# Patient Record
Sex: MALE | Race: BLACK OR AFRICAN AMERICAN | Employment: FULL TIME | URBAN - METROPOLITAN AREA
[De-identification: names, ages, dates, MRNs, and addresses within clinical notes are randomized per-mention and may not be internally consistent; named-entity substitution may affect disease eponyms.]

---

## 2017-02-15 ENCOUNTER — OFFICE VISIT (OUTPATIENT)
Dept: FAMILY MEDICINE CLINIC | Age: 47
End: 2017-02-15

## 2017-02-15 VITALS
RESPIRATION RATE: 16 BRPM | HEIGHT: 71 IN | OXYGEN SATURATION: 98 % | WEIGHT: 223 LBS | TEMPERATURE: 98.9 F | SYSTOLIC BLOOD PRESSURE: 135 MMHG | BODY MASS INDEX: 31.22 KG/M2 | DIASTOLIC BLOOD PRESSURE: 85 MMHG | HEART RATE: 83 BPM

## 2017-02-15 DIAGNOSIS — K59.00 CONSTIPATION, UNSPECIFIED CONSTIPATION TYPE: ICD-10-CM

## 2017-02-15 DIAGNOSIS — K64.9 HEMORRHOIDS, UNSPECIFIED HEMORRHOID TYPE: Primary | ICD-10-CM

## 2017-02-15 DIAGNOSIS — R10.32 LLQ ABDOMINAL PAIN: ICD-10-CM

## 2017-02-15 PROCEDURE — 99214 OFFICE O/P EST MOD 30 MIN: CPT | Performed by: NURSE PRACTITIONER

## 2017-02-15 RX ORDER — HYDROCORTISONE ACETATE 25 MG/1
25 SUPPOSITORY RECTAL EVERY 12 HOURS
Qty: 60 SUPPOSITORY | Refills: 0 | Status: SHIPPED | OUTPATIENT
Start: 2017-02-15 | End: 2017-05-18

## 2017-02-15 ASSESSMENT — ENCOUNTER SYMPTOMS
ABDOMINAL PAIN: 1
SHORTNESS OF BREATH: 0
BELCHING: 0
CHEST TIGHTNESS: 0
COUGH: 0
EYES NEGATIVE: 1
NAUSEA: 0
VOMITING: 0
ALLERGIC/IMMUNOLOGIC NEGATIVE: 1
BACK PAIN: 0
WHEEZING: 0
FLATUS: 1
CONSTIPATION: 1
DIARRHEA: 0
HEMATOCHEZIA: 0

## 2017-02-15 ASSESSMENT — CROHNS DISEASE ACTIVITY INDEX (CDAI): CDAI SCORE: 0

## 2017-02-15 ASSESSMENT — PATIENT HEALTH QUESTIONNAIRE - PHQ9
SUM OF ALL RESPONSES TO PHQ QUESTIONS 1-9: 0
1. LITTLE INTEREST OR PLEASURE IN DOING THINGS: 0
2. FEELING DOWN, DEPRESSED OR HOPELESS: 0
SUM OF ALL RESPONSES TO PHQ9 QUESTIONS 1 & 2: 0

## 2017-05-18 ENCOUNTER — HOSPITAL ENCOUNTER (OUTPATIENT)
Dept: GENERAL RADIOLOGY | Age: 47
Discharge: HOME | End: 2017-05-18

## 2017-05-18 ENCOUNTER — OFFICE VISIT (OUTPATIENT)
Dept: FAMILY MEDICINE CLINIC | Age: 47
End: 2017-05-18

## 2017-05-18 VITALS
RESPIRATION RATE: 14 BRPM | HEART RATE: 82 BPM | DIASTOLIC BLOOD PRESSURE: 88 MMHG | WEIGHT: 220 LBS | OXYGEN SATURATION: 97 % | SYSTOLIC BLOOD PRESSURE: 118 MMHG | HEIGHT: 70 IN | BODY MASS INDEX: 31.5 KG/M2

## 2017-05-18 DIAGNOSIS — M25.561 RIGHT KNEE PAIN, UNSPECIFIED CHRONICITY: ICD-10-CM

## 2017-05-18 DIAGNOSIS — M25.561 ACUTE PAIN OF RIGHT KNEE: Primary | ICD-10-CM

## 2017-05-18 PROCEDURE — 99214 OFFICE O/P EST MOD 30 MIN: CPT | Performed by: FAMILY MEDICINE

## 2017-05-18 RX ORDER — MELOXICAM 15 MG/1
15 TABLET ORAL DAILY
Qty: 30 TABLET | Refills: 3 | Status: SHIPPED | OUTPATIENT
Start: 2017-05-18 | End: 2017-06-06 | Stop reason: ALTCHOICE

## 2017-05-18 ASSESSMENT — ENCOUNTER SYMPTOMS
COLOR CHANGE: 0
NAUSEA: 0
VOMITING: 0
BACK PAIN: 0
ABDOMINAL PAIN: 0

## 2017-06-06 ENCOUNTER — OFFICE VISIT (OUTPATIENT)
Dept: FAMILY MEDICINE CLINIC | Age: 47
End: 2017-06-06

## 2017-06-06 VITALS
HEIGHT: 69 IN | SYSTOLIC BLOOD PRESSURE: 130 MMHG | WEIGHT: 222 LBS | TEMPERATURE: 98.3 F | DIASTOLIC BLOOD PRESSURE: 88 MMHG | HEART RATE: 98 BPM | RESPIRATION RATE: 16 BRPM | BODY MASS INDEX: 32.88 KG/M2

## 2017-06-06 DIAGNOSIS — K64.9 HEMORRHOIDS, UNSPECIFIED HEMORRHOID TYPE: ICD-10-CM

## 2017-06-06 DIAGNOSIS — M25.561 ACUTE PAIN OF RIGHT KNEE: ICD-10-CM

## 2017-06-06 DIAGNOSIS — Z00.00 ROUTINE GENERAL MEDICAL EXAMINATION AT A HEALTH CARE FACILITY: Primary | ICD-10-CM

## 2017-06-06 DIAGNOSIS — Z00.00 ROUTINE GENERAL MEDICAL EXAMINATION AT A HEALTH CARE FACILITY: ICD-10-CM

## 2017-06-06 LAB
BILIRUBIN, POC: NORMAL
BLOOD URINE, POC: NORMAL
CLARITY, POC: NORMAL
COLOR, POC: NORMAL
GLUCOSE URINE, POC: NORMAL
KETONES, POC: NORMAL
LEUKOCYTE EST, POC: NORMAL
NITRITE, POC: NORMAL
PH, POC: 6.5
PROTEIN, POC: NORMAL
SPECIFIC GRAVITY, POC: 1.02
UROBILINOGEN, POC: NORMAL

## 2017-06-06 PROCEDURE — 99396 PREV VISIT EST AGE 40-64: CPT | Performed by: FAMILY MEDICINE

## 2017-06-06 PROCEDURE — 81003 URINALYSIS AUTO W/O SCOPE: CPT | Performed by: FAMILY MEDICINE

## 2017-06-06 ASSESSMENT — ENCOUNTER SYMPTOMS
CONSTIPATION: 0
PHOTOPHOBIA: 0
NAUSEA: 0
COUGH: 0
SHORTNESS OF BREATH: 0
ABDOMINAL PAIN: 0
TROUBLE SWALLOWING: 0
SINUS PRESSURE: 0
ANAL BLEEDING: 0
DIARRHEA: 0
WHEEZING: 0
SORE THROAT: 0
BLOOD IN STOOL: 0
BACK PAIN: 0

## 2017-06-07 LAB
A/G RATIO: 1.4 (ref 1.1–2.2)
ALBUMIN SERPL-MCNC: 4.6 G/DL (ref 3.4–5)
ALP BLD-CCNC: 58 U/L (ref 40–129)
ALT SERPL-CCNC: 24 U/L (ref 10–40)
ANION GAP SERPL CALCULATED.3IONS-SCNC: 19 MMOL/L (ref 3–16)
AST SERPL-CCNC: 20 U/L (ref 15–37)
BASOPHILS ABSOLUTE: 0.1 K/UL (ref 0–0.2)
BASOPHILS RELATIVE PERCENT: 0.6 %
BILIRUB SERPL-MCNC: 0.3 MG/DL (ref 0–1)
BUN BLDV-MCNC: 10 MG/DL (ref 7–20)
CALCIUM SERPL-MCNC: 9.6 MG/DL (ref 8.3–10.6)
CHLORIDE BLD-SCNC: 101 MMOL/L (ref 99–110)
CHOLESTEROL, TOTAL: 290 MG/DL (ref 0–199)
CO2: 23 MMOL/L (ref 21–32)
CREAT SERPL-MCNC: 1 MG/DL (ref 0.9–1.3)
EOSINOPHILS ABSOLUTE: 0.2 K/UL (ref 0–0.6)
EOSINOPHILS RELATIVE PERCENT: 2.1 %
GFR AFRICAN AMERICAN: >60
GFR NON-AFRICAN AMERICAN: >60
GLOBULIN: 3.2 G/DL
GLUCOSE BLD-MCNC: 89 MG/DL (ref 70–99)
HCT VFR BLD CALC: 50.2 % (ref 40.5–52.5)
HDLC SERPL-MCNC: 36 MG/DL (ref 40–60)
HEMOGLOBIN: 16.3 G/DL (ref 13.5–17.5)
LDL CHOLESTEROL CALCULATED: ABNORMAL MG/DL
LDL CHOLESTEROL DIRECT: 176 MG/DL
LYMPHOCYTES ABSOLUTE: 3.3 K/UL (ref 1–5.1)
LYMPHOCYTES RELATIVE PERCENT: 30.4 %
MAGNESIUM: 2.1 MG/DL (ref 1.8–2.4)
MCH RBC QN AUTO: 32.2 PG (ref 26–34)
MCHC RBC AUTO-ENTMCNC: 32.5 G/DL (ref 31–36)
MCV RBC AUTO: 99.1 FL (ref 80–100)
MONOCYTES ABSOLUTE: 0.5 K/UL (ref 0–1.3)
MONOCYTES RELATIVE PERCENT: 5.1 %
NEUTROPHILS ABSOLUTE: 6.7 K/UL (ref 1.7–7.7)
NEUTROPHILS RELATIVE PERCENT: 61.8 %
PDW BLD-RTO: 15.7 % (ref 12.4–15.4)
PLATELET # BLD: 272 K/UL (ref 135–450)
PMV BLD AUTO: 9.3 FL (ref 5–10.5)
POTASSIUM SERPL-SCNC: 5 MMOL/L (ref 3.5–5.1)
PROSTATE SPECIFIC ANTIGEN: 0.43 NG/ML (ref 0–4)
RBC # BLD: 5.07 M/UL (ref 4.2–5.9)
SODIUM BLD-SCNC: 143 MMOL/L (ref 136–145)
T3 FREE: 3.4 PG/ML (ref 2.3–4.2)
T4 FREE: 1.3 NG/DL (ref 0.9–1.8)
TOTAL PROTEIN: 7.8 G/DL (ref 6.4–8.2)
TRIGL SERPL-MCNC: 393 MG/DL (ref 0–150)
TSH SERPL DL<=0.05 MIU/L-ACNC: 1.38 UIU/ML (ref 0.27–4.2)
VITAMIN D 25-HYDROXY: 15 NG/ML
VLDLC SERPL CALC-MCNC: ABNORMAL MG/DL
WBC # BLD: 10.8 K/UL (ref 4–11)

## 2017-06-19 ENCOUNTER — OFFICE VISIT (OUTPATIENT)
Dept: SURGERY | Age: 47
End: 2017-06-19

## 2017-06-19 VITALS
HEIGHT: 69 IN | SYSTOLIC BLOOD PRESSURE: 120 MMHG | BODY MASS INDEX: 32.73 KG/M2 | DIASTOLIC BLOOD PRESSURE: 70 MMHG | WEIGHT: 221 LBS

## 2017-06-19 DIAGNOSIS — K64.9 HEMORRHOIDS, UNSPECIFIED HEMORRHOID TYPE: Primary | ICD-10-CM

## 2017-06-19 PROCEDURE — 46600 DIAGNOSTIC ANOSCOPY SPX: CPT | Performed by: SURGERY

## 2017-06-19 PROCEDURE — 99243 OFF/OP CNSLTJ NEW/EST LOW 30: CPT | Performed by: SURGERY

## 2017-06-20 ENCOUNTER — TELEPHONE (OUTPATIENT)
Dept: FAMILY MEDICINE CLINIC | Age: 47
End: 2017-06-20

## 2018-07-17 ENCOUNTER — OFFICE VISIT (OUTPATIENT)
Dept: FAMILY MEDICINE CLINIC | Age: 48
End: 2018-07-17

## 2018-07-17 VITALS
DIASTOLIC BLOOD PRESSURE: 88 MMHG | BODY MASS INDEX: 33.49 KG/M2 | HEIGHT: 68 IN | HEART RATE: 95 BPM | SYSTOLIC BLOOD PRESSURE: 126 MMHG | OXYGEN SATURATION: 98 % | WEIGHT: 221 LBS

## 2018-07-17 DIAGNOSIS — H10.33 ACUTE CONJUNCTIVITIS OF BOTH EYES, UNSPECIFIED ACUTE CONJUNCTIVITIS TYPE: Primary | ICD-10-CM

## 2018-07-17 DIAGNOSIS — K59.00 CONSTIPATION, UNSPECIFIED CONSTIPATION TYPE: ICD-10-CM

## 2018-07-17 PROCEDURE — 99213 OFFICE O/P EST LOW 20 MIN: CPT | Performed by: FAMILY MEDICINE

## 2018-07-17 RX ORDER — CIPROFLOXACIN HYDROCHLORIDE 3.5 MG/ML
2 SOLUTION/ DROPS TOPICAL 4 TIMES DAILY
Qty: 1 BOTTLE | Refills: 0 | Status: SHIPPED | OUTPATIENT
Start: 2018-07-17 | End: 2018-07-20

## 2018-07-17 ASSESSMENT — PATIENT HEALTH QUESTIONNAIRE - PHQ9
1. LITTLE INTEREST OR PLEASURE IN DOING THINGS: 0
2. FEELING DOWN, DEPRESSED OR HOPELESS: 0
SUM OF ALL RESPONSES TO PHQ QUESTIONS 1-9: 0
SUM OF ALL RESPONSES TO PHQ9 QUESTIONS 1 & 2: 0

## 2018-07-17 ASSESSMENT — ENCOUNTER SYMPTOMS
CONSTIPATION: 0
DIARRHEA: 0
BLOOD IN STOOL: 0

## 2018-07-17 NOTE — PROGRESS NOTES
Subjective:      Patient ID: Giovanni Pena is a 41 y.o. male. HPI  Watery and red eyes. 3 days. Left into the right. Not real crusty . Mucous like. Vision his normal.  Irritated feeling. No pain with blink    Not a lot of allergies    Review of Systems   Constitutional: Negative for chills, fever and unexpected weight change. Gastrointestinal: Negative for blood in stool, constipation and diarrhea. Some \"full\" feeling in the upper abdomen- left more so past couple of months  Bowels generally OK. Some feeling even after BM    No dyspepsia / gerd sx    No family hx Cancer   Genitourinary: Negative for dysuria. Objective:   Physical Exam   Constitutional: He appears well-developed and well-nourished. No distress. Eyes:   Some proptosis -  His usual  EOM OK  Pupils equal  Injected sclera,    Mucous discharge   Abdominal: He exhibits no distension and no mass. There is no tenderness. Non- specific abdomen. No mass  Mid left abdomen the area identified   Vitals reviewed. Assessment:        Blepharoconjunctivitis      Plan:           Treat the eyes with abx drop. May be allergy some but no allergy hx. Follow 3-4 days if not better. If better , follow as needed For the eyes    Call the stomach doctor for evaluation    GI eval.    Prior to Visit Medications    Medication Sig Taking? Authorizing Provider   Elastic Bandages & Supports (NEOPRENE KNEE SUPPORT) MIS 1 kit by Does not apply route daily Yes Marisela Moreno,    erythromycin [facility]) 5 MG/GM ophthalmic ointment Place into both eyes 3 times daily for 10 days Nightly.   Marisela Moreno, DO

## 2018-07-20 ENCOUNTER — OFFICE VISIT (OUTPATIENT)
Dept: FAMILY MEDICINE CLINIC | Age: 48
End: 2018-07-20

## 2018-07-20 VITALS
RESPIRATION RATE: 14 BRPM | SYSTOLIC BLOOD PRESSURE: 138 MMHG | DIASTOLIC BLOOD PRESSURE: 82 MMHG | BODY MASS INDEX: 33.77 KG/M2 | HEART RATE: 91 BPM | OXYGEN SATURATION: 96 % | WEIGHT: 228 LBS | HEIGHT: 69 IN

## 2018-07-20 DIAGNOSIS — H10.33 ACUTE CONJUNCTIVITIS OF BOTH EYES, UNSPECIFIED ACUTE CONJUNCTIVITIS TYPE: Primary | ICD-10-CM

## 2018-07-20 PROCEDURE — 99213 OFFICE O/P EST LOW 20 MIN: CPT | Performed by: FAMILY MEDICINE

## 2018-07-20 RX ORDER — ERYTHROMYCIN 5 MG/G
OINTMENT OPHTHALMIC 3 TIMES DAILY
Qty: 1 TUBE | Refills: 0 | Status: SHIPPED | OUTPATIENT
Start: 2018-07-20 | End: 2018-07-30

## 2018-07-20 ASSESSMENT — ENCOUNTER SYMPTOMS
PHOTOPHOBIA: 0
EYE ITCHING: 0
EYE PAIN: 1
EYE REDNESS: 1
EYE DISCHARGE: 1

## 2018-07-20 NOTE — PATIENT INSTRUCTIONS
You should quit smoking for the benefit of your health and those who love you. Your goal is to quit smoking. The first step is to cut down your cigarettes per week by half. This is your immediate goal.   Cut down your number of cigarettes per week in half before your next visit. Go to  [employer] on the internet for more assistance in your effort to quit smoking. Or call 0-776- Naida Millan for more assistance. Good luck and keep up the effort! Many patients quit after several times of trying to quit and failing, so do not get discouraged if you don't quit on your first try.

## 2018-07-20 NOTE — PROGRESS NOTES
DO   Elastic Bandages & Supports (NEOPRENE KNEE SUPPORT) MISC 1 kit by Does not apply route daily Yes Opal Escobar,

## 2018-08-02 ENCOUNTER — INITIAL CONSULT (OUTPATIENT)
Dept: GASTROENTEROLOGY | Age: 48
End: 2018-08-02

## 2018-08-02 ENCOUNTER — TELEPHONE (OUTPATIENT)
Dept: GASTROENTEROLOGY | Age: 48
End: 2018-08-02

## 2018-08-02 VITALS
WEIGHT: 228.6 LBS | HEIGHT: 69 IN | SYSTOLIC BLOOD PRESSURE: 120 MMHG | DIASTOLIC BLOOD PRESSURE: 80 MMHG | BODY MASS INDEX: 33.86 KG/M2

## 2018-08-02 DIAGNOSIS — Z12.11 SCREENING FOR COLON CANCER: ICD-10-CM

## 2018-08-02 DIAGNOSIS — K59.00 CONSTIPATION, UNSPECIFIED CONSTIPATION TYPE: Primary | ICD-10-CM

## 2018-08-02 PROCEDURE — 99204 OFFICE O/P NEW MOD 45 MIN: CPT | Performed by: INTERNAL MEDICINE

## 2018-08-02 RX ORDER — POLYETHYLENE GLYCOL 3350, SODIUM CHLORIDE, POTASSIUM CHLORIDE, SODIUM BICARBONATE, AND SODIUM SULFATE 240; 5.84; 2.98; 6.72; 22.72 G/4L; G/4L; G/4L; G/4L; G/4L
4000 POWDER, FOR SOLUTION ORAL ONCE
Qty: 1 BOTTLE | Refills: 0 | Status: SHIPPED | OUTPATIENT
Start: 2018-08-02 | End: 2018-08-02

## 2018-08-02 NOTE — PROGRESS NOTES
[address] ,  [address] [facility]  Phone: [phone] [address]     Chief Complaint   Patient presents with    Constipation     Started about 1 year ago, also has hemorrhoids, abdominal pain     HPI     Thank you Yamilet Murrell DO for asking me to see Media Free in consultation. He is a  [2] Black [2] 41 y.o. . 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 Media Free  Complains of 1 year of constipation with incomplete evacuation with associated intermittent hemorrhoid swelling but not bleeding. No family history of gi problems including no polyps or cancer. Ca and TSH are normal.  Has tried multiple fibers and miralax without relief. HPI elements: location, severity, timing, modifying factors, quality, duration, context and associated signs/symptoms. Last Encounter Reviewed:   Pertinent PMH, FH, SH is reviewed below. Last EGD: none  Last Colonoscopy: none    Review of available records reveals:   Wt Readings from Last 50 Encounters:   08/02/18 228 lb 9.6 oz (103.7 kg)   07/20/18 228 lb (103.4 kg)   07/17/18 221 lb (100.2 kg)   06/19/17 221 lb (100.2 kg)   06/06/17 222 lb (100.7 kg)   05/18/17 220 lb (99.8 kg)   02/15/17 223 lb (101.2 kg)   12/20/16 210 lb (95.3 kg)   04/22/16 213 lb (96.6 kg)   09/03/15 217 lb (98.4 kg)   05/28/15 216 lb (98 kg)       No components found for: HGBA1C  BP Readings from Last 3 Encounters:   08/02/18 120/80   07/20/18 138/82   07/17/18 126/88     Health Maintenance   Topic Date Due    HIV screen  12/04/1987    DTaP/Tdap/Td vaccine (1 - Tdap) 12/04/1991    Pneumococcal med risk (1 of 1 - PPSV23) 12/04/1991    Flu vaccine (1) 09/01/2018    Lipid screen  06/06/2022       No components found for: [facility]     PAST MEDICAL HISTORY     Past Medical History:   Diagnosis Date    Achilles tendonitis 2011    dr Colette Barksdale Screening or Diagnostic? Answer:   Loyda Chavira was seen today for constipation. Diagnoses and all orders for this visit:    Constipation, unspecified constipation type  -     linaclotide (LINZESS) 290 MCG CAPS capsule; Take 1 capsule by mouth every morning (before breakfast)    Screening for colon cancer  -     COLONOSCOPY W/ OR W/O BIOPSY; Future  -     bisacodyl (DULCOLAX) 5 MG EC tablet; Take 1 tablet by mouth daily as needed for Constipation  -     polyethylene glycol-electrolytes (COLYTE) 240 g SOLR solution; Take 4,000 mLs by mouth once for 1 dose    Will provide a script for linzess at time of colonoscopy if it helps. Current recs are to begin screening for crc at the age of 39 in  [place]. ORDERED FUTURE/PENDING TESTS     Lab Frequency Next Occurrence       FOLLOWUP   Return for Colonoscopy.           Adri WATTS 8/2/18 1:35 PM    CC:  Cathy Noble DO

## 2018-08-02 NOTE — PATIENT INSTRUCTIONS
CONSTIPATION OVERVIEW - Constipation refers to a change in bowel habits, but it has varied meanings. Stools may be too hard or too small, difficult to pass, or infrequent (less than three times per week). People with constipation may also notice a frequent need to strain and a sense that the bowels are not empty. Constipation is a very common problem. Each year more than 2.5 million Americans visit their healthcare provider for relief from this problem. Many factors can contribute to or cause constipation, although in most people, no single cause can be found. In general, constipation occurs more frequently as you get older. (See \"Etiology and evaluation of chronic constipation in adults\".)    CONSTIPATION DIAGNOSIS - Constipation can usually be diagnosed based upon your symptoms and a physical examination. You should also mention any medications you take regularly since some medications can cause constipation. You may need a rectal examination as part of a physical examination. A rectal examination involves inserting a gloved finger inside the rectum to feel for any lumps or abnormalities. This test can also check for blood in the stool. Further testing may be ordered in some situations, for example, if you have had a recent change in bowel habits, blood in the stool, weight loss, or a family history of colon cancer. Testing may include blood tests, x-rays, sigmoidoscopy, colonoscopy, or more specialized testing if needed. (See \"Patient information: Flexible sigmoidoscopy\" and \"Patient information: Colonoscopy\". )    When to seek help - Most people can treat constipation at home, without seeing a healthcare provider.  However, you should speak with a healthcare provider if the problem:        Is new (ie, represents a change in your normal pattern)      Lasts longer than three weeks      Is severe      Is associated with any other concerning features such as blood on the toilet paper, weight loss, fevers, or help - Most people can treat constipation at home, without seeing a healthcare provider. However, you should speak with a healthcare provider if the problem:        Is new (ie, represents a change in your normal pattern)      Lasts longer than three weeks      Is severe      Is associated with any other concerning features such as blood on the toilet paper, weight loss, fevers, or weakness. CONSTIPATION TREATMENT - Treatment for constipation includes changing some behaviors, eating foods high in fiber, and using laxatives or enemas if needed. You can try these treatments at home, before seeing a healthcare provider. However, if you do not have a bowel movement within a few days, you should call your healthcare provider for further assistance. (See \"Management of chronic constipation in adults\". )    Behavior changes - The bowels are most active following meals, and this is often the time when stools will pass most readily. If you ignore your body's signals to have a bowel movement, the signals become weaker and weaker over time. By paying close attention to these signals, you may have an easier time moving your bowels. Drinking a caffeine-containing beverage in the morning may also be helpful. Increase fiber - Increasing fiber in your diet may reduce or eliminate constipation. The recommended amount of dietary fiber is 20 to 35 grams of fiber per day. By reading the product information panel on the side of the package, you can determine the number of grams of fiber per serving (figure 1). Many fruits and vegetables can be particularly helpful in preventing and treating constipation (table 1). This is especially true of citrus fruits, prunes, and prune juice. Some breakfast cereals are also an excellent source of dietary fiber. (See \"Patient information: High fiber diet\". )    Fiber side effects - Consuming large amounts of fiber can cause abdominal bloating or gas; this can be minimized by starting with a small amount and slowly increasing until stools become softer and more frequent. LAXATIVES - If behavior changes and increasing fiber does not relieve your constipation, you may try taking a laxative. A variety of laxatives are available for treating constipation. The choice between them is based upon how they work, how safe the treatment is, and your healthcare provider's preferences. In general, laxatives can be categorized into the following groups:    Bulk forming laxatives - These include natural fiber and commercial fiber preparations such as:        Psyllium (Konsyl®; Metamucil®; Perdiem®)      Methylcellulose (Citrucel®)      Calcium polycarbophil (FiberCon®; Fiber-Lax®; Mitrolan®). Wheat dextrin (Benefiber®)    You should increase the dose of fiber supplements slowly to prevent gas and cramping, and you should always take the supplement with plenty of fluid. Hyperosmolar laxatives - Hyperosmolar laxatives include:        Polyethylene glycol (MiraLax®, Glycolax®)      Lactulose      Sorbitol    Polyethylene glycol is generally preferred since it does not cause gas or bloating and is available in the [place] without a prescription. Lactulose and sorbitol can produce gas and bloating. Sorbitol works as well as lactulose and is much less expensive. Saline laxatives - Saline laxatives such as magnesium hydroxide (Milk of Magnesia®) and magnesium citrate (Evac-Q-Mag®) act similarly to the hyperosmolar laxatives. Stimulant laxatives - Stimulant laxatives include senna (eg, [facility] [place], ex-lax®, Real's® Castoria®, Senokot®) and bisacodyl (eg, Correctol®, Doxidan®, Dulcolax®). Some people overuse stimulant laxatives. Taking stimulant laxatives regularly or in large amounts can cause side effects, including low potassium levels. Thus, you should take these drugs carefully if you must use them regularly.     However, there is no convincing evidence that using stimulant laxatives

## 2018-08-02 NOTE — LETTER
COLONOSCOPY PREP INSTRUCTIONS  COLYTE SPLIT DOSE  [address] [facility], [address] [facility] , [place], [facility]     Your colonoscopy is scheduled on: ________   __Ann___  Arrival Time: ______   DO NOT EAT OR DRINK (INCLUDING WATER) AFTER: Taking 2nd dose of prep, morning of procedure_________  's Name_Shirley_ [phone]    Keep these papers together; REVIEW ALL OF THEM AT LEAST 7 DAYS BEFORE THE PROCEDURE. Please complete all paperwork; including a current list of your medications, to avoid delays in the admission process. The following instructions must be followed in order to ensure your procedure has optimal outcomes. - KEEP YOUR APPOINTMENT. If for any reason, you are unable to keep your appointment, please notify us at [phone] within 72 hours before your procedure. - You MUST have a responsible adult to drive you, who MUST remain at our facility the ENTIRE time. If not the procedure will be cancelled. You may go by taxi ONLY if you have a responsible adult with you. You may experience light headedness, dizziness etc., therefore you should have a responsible adult remain with you until the morning after your procedure. - Bring your insurance card and 's license. Call your insurance carrier to verify your benefits, and confirm that our facility is in your network, prior to the procedure date to ensure coverage. The facility name is listed as [facility] or [place] [address] and the tax ID# is [id number].  - Due to the safety and privacy of our patients, only one visitor is allowed in the recovery area after the procedure. The center will not be responsible for lost valuables so please leave them at home. - Try to avoid seeds (strawberries, dion, and rye) for one week prior to your procedure.   - If you have questions after beginning the prep, call [phone] Cyclopentyltrazolopyrimide (Ticagrelor or Brilinta), Cangrelor (Kirkland Olszewski), Dabigatran (Pradaxa), Apixaban (Eliquis), Edoxaban (Savaysa)you should have received instructions regarding if and when to discontinue the medication. If you have not, or do not clearly understand the instructions, please call the office for clarification ([phone]). 5. Drink plenty of fluid. 1 day prior to your procedure:  1. Do not eat any SOLID FOOD, beginning with breakfast drink clear liquids only, which includes: Chicken or Beef Broth, Coffee/tea (without milk or creamer) Gatorade/PowerAde (no red or purple), JeIl-O (no red or purple), All Soda (even dark cola), Sorbet/Popsicles (no red or purple), Water. If you take Diabetic medications (insulin/oral medication)-reduce the amount by one half on the morning of prep. You may resume the meds once you begin eating again. You must drink 8oz of liquids every hour to avoid dehydration. 2. If you take Diabetic medications (insulin/oral medication) - reduce the amount by one half on morning of prep. You may resume the meds once you begin eating again. 3. Bowel Cleansing Prep  * Prepare your Colyte by adding water to the fill line. Powder flavoring, like crystal light (not red or purple), may be added for flavor. Chilling the Colyte solution in the refrigerator and/or drinking the solution through a straw may make it easier to drink. (do not add ice)   5:45 pm Take all 4 dulcolax tablets   6:00pm Begin to drink the colyte/golyte solution. Drink one 8oz glass every 15 minutes until you have finished half of the solution (about 8 glasses, or 2 liters). It is ok to take short breaks in between glasses if you experience nausea or fullness. Restart when the symptoms improve. Continue to drink 8oz of liquids an hour until bedtime. Powder flavoring like crystal light may be added for flavor (no red or purple). MORNING OF PROCEDURE  1. ______(time) Repeat the 6:00 pm instructions. 2. Take your Blood pressure, Heart and Seizure medication the morning of the procedure with sips of water. 3. Bring inhalers with you. 4. Do not take your Diabetic medication the morning of procedure. 5. If you are not having an EGD, you may continue drinking liquids until 2hrs prior to procedure. Dear Patient,      Antoine Berrios will receive a call from the [facility] pre-registration department prior to your GI procedure. This will help streamline your check-in process on the day of service. During this call a skilled associate will review your demographic and insurance benefits information. The associate will answer any question pertaining to your insurance contract, such as deductible/co-insurance/ co-pay or any other financial concerns. They may offer an amount that you could pay on the day of surgery but this is not a requirement. Thank you for allowing [facility] to be part of your [address], [facility] [address] [facility]      Once you turn into the hospital, turn right (towards E.R.) go past the E.R., youll see a driveway on your left. Make that turn, the sign will say [facility] or Surgery entrance. [address] [facility]  Go through 2 sets of double doors, sign in at window, you will get registered there and they will send you up the hallway to the Endoscopy area.

## 2018-09-12 ENCOUNTER — TELEPHONE (OUTPATIENT)
Dept: GASTROENTEROLOGY | Age: 48
End: 2018-09-12

## 2018-09-12 NOTE — TELEPHONE ENCOUNTER
Sent pt a MiniLuxet message to see if he wanted to schedule the Colon with [facility] yet, will await response

## 2018-11-13 ENCOUNTER — OFFICE VISIT (OUTPATIENT)
Dept: FAMILY MEDICINE CLINIC | Age: 48
End: 2018-11-13
Payer: COMMERCIAL

## 2018-11-13 VITALS
HEIGHT: 68 IN | HEART RATE: 88 BPM | WEIGHT: 230 LBS | DIASTOLIC BLOOD PRESSURE: 80 MMHG | OXYGEN SATURATION: 99 % | BODY MASS INDEX: 34.86 KG/M2 | SYSTOLIC BLOOD PRESSURE: 132 MMHG

## 2018-11-13 DIAGNOSIS — K59.00 CONSTIPATION, UNSPECIFIED CONSTIPATION TYPE: ICD-10-CM

## 2018-11-13 DIAGNOSIS — Z00.00 ANNUAL PHYSICAL EXAM: Primary | ICD-10-CM

## 2018-11-13 DIAGNOSIS — Z00.00 ANNUAL PHYSICAL EXAM: ICD-10-CM

## 2018-11-13 LAB
A/G RATIO: 1.5 (ref 1.1–2.2)
ALBUMIN SERPL-MCNC: 4.6 G/DL (ref 3.4–5)
ALP BLD-CCNC: 56 U/L (ref 40–129)
ALT SERPL-CCNC: 29 U/L (ref 10–40)
ANION GAP SERPL CALCULATED.3IONS-SCNC: 14 MMOL/L (ref 3–16)
AST SERPL-CCNC: 29 U/L (ref 15–37)
ATYPICAL LYMPHOCYTE RELATIVE PERCENT: 1 % (ref 0–6)
BASOPHILS ABSOLUTE: 0.1 K/UL (ref 0–0.2)
BASOPHILS RELATIVE PERCENT: 1 %
BILIRUB SERPL-MCNC: 0.6 MG/DL (ref 0–1)
BUN BLDV-MCNC: 9 MG/DL (ref 7–20)
CALCIUM SERPL-MCNC: 10 MG/DL (ref 8.3–10.6)
CHLORIDE BLD-SCNC: 99 MMOL/L (ref 99–110)
CHOLESTEROL, TOTAL: 269 MG/DL (ref 0–199)
CO2: 26 MMOL/L (ref 21–32)
CREAT SERPL-MCNC: 1 MG/DL (ref 0.9–1.3)
EOSINOPHILS ABSOLUTE: 0.2 K/UL (ref 0–0.6)
EOSINOPHILS RELATIVE PERCENT: 2 %
GFR AFRICAN AMERICAN: >60
GFR NON-AFRICAN AMERICAN: >60
GLOBULIN: 3 G/DL
GLUCOSE BLD-MCNC: 94 MG/DL (ref 70–99)
HCT VFR BLD CALC: 47.4 % (ref 40.5–52.5)
HDLC SERPL-MCNC: 35 MG/DL (ref 40–60)
HEMOGLOBIN: 16 G/DL (ref 13.5–17.5)
LDL CHOLESTEROL CALCULATED: 180 MG/DL
LYMPHOCYTES ABSOLUTE: 3.3 K/UL (ref 1–5.1)
LYMPHOCYTES RELATIVE PERCENT: 36 %
MCH RBC QN AUTO: 33.1 PG (ref 26–34)
MCHC RBC AUTO-ENTMCNC: 33.8 G/DL (ref 31–36)
MCV RBC AUTO: 97.9 FL (ref 80–100)
MONOCYTES ABSOLUTE: 0.6 K/UL (ref 0–1.3)
MONOCYTES RELATIVE PERCENT: 7 %
NEUTROPHILS ABSOLUTE: 4.8 K/UL (ref 1.7–7.7)
NEUTROPHILS RELATIVE PERCENT: 53 %
PDW BLD-RTO: 15.1 % (ref 12.4–15.4)
PLATELET # BLD: 280 K/UL (ref 135–450)
PLATELET SLIDE REVIEW: ADEQUATE
PMV BLD AUTO: 8.9 FL (ref 5–10.5)
POTASSIUM SERPL-SCNC: 5.1 MMOL/L (ref 3.5–5.1)
RBC # BLD: 4.84 M/UL (ref 4.2–5.9)
RBC # BLD: NORMAL 10*6/UL
SLIDE REVIEW: NORMAL
SODIUM BLD-SCNC: 139 MMOL/L (ref 136–145)
TOTAL PROTEIN: 7.6 G/DL (ref 6.4–8.2)
TRIGL SERPL-MCNC: 269 MG/DL (ref 0–150)
TSH REFLEX: 1.42 UIU/ML (ref 0.27–4.2)
VITAMIN D 25-HYDROXY: 10.3 NG/ML
VLDLC SERPL CALC-MCNC: 54 MG/DL
WBC # BLD: 9 K/UL (ref 4–11)

## 2018-11-13 PROCEDURE — 99396 PREV VISIT EST AGE 40-64: CPT | Performed by: FAMILY MEDICINE

## 2018-11-13 PROCEDURE — 90732 PPSV23 VACC 2 YRS+ SUBQ/IM: CPT | Performed by: FAMILY MEDICINE

## 2018-11-13 PROCEDURE — 90472 IMMUNIZATION ADMIN EACH ADD: CPT | Performed by: FAMILY MEDICINE

## 2018-11-13 PROCEDURE — 90471 IMMUNIZATION ADMIN: CPT | Performed by: FAMILY MEDICINE

## 2018-11-13 PROCEDURE — 90686 IIV4 VACC NO PRSV 0.5 ML IM: CPT | Performed by: FAMILY MEDICINE

## 2018-11-13 ASSESSMENT — ENCOUNTER SYMPTOMS
SORE THROAT: 0
ABDOMINAL PAIN: 0
CONSTIPATION: 1
SHORTNESS OF BREATH: 0
TROUBLE SWALLOWING: 0
BLOOD IN STOOL: 0
BACK PAIN: 0
PHOTOPHOBIA: 0
WHEEZING: 0
COUGH: 0
SINUS PRESSURE: 0
DIARRHEA: 0

## 2018-11-28 ENCOUNTER — TELEPHONE (OUTPATIENT)
Dept: GASTROENTEROLOGY | Age: 48
End: 2018-11-28

## 2018-12-17 ENCOUNTER — TELEPHONE (OUTPATIENT)
Dept: GASTROENTEROLOGY | Age: 48
End: 2018-12-17

## 2018-12-18 ENCOUNTER — ANESTHESIA (OUTPATIENT)
Dept: ENDOSCOPY | Age: 48
End: 2018-12-18
Payer: COMMERCIAL

## 2018-12-18 ENCOUNTER — ANESTHESIA EVENT (OUTPATIENT)
Dept: ENDOSCOPY | Age: 48
End: 2018-12-18
Payer: COMMERCIAL

## 2018-12-18 ENCOUNTER — HOSPITAL ENCOUNTER (OUTPATIENT)
Age: 48
Setting detail: OUTPATIENT SURGERY
Discharge: HOME | End: 2018-12-18
Attending: INTERNAL MEDICINE | Admitting: INTERNAL MEDICINE
Payer: COMMERCIAL

## 2018-12-18 VITALS
SYSTOLIC BLOOD PRESSURE: 147 MMHG | OXYGEN SATURATION: 100 % | HEART RATE: 70 BPM | BODY MASS INDEX: 31.5 KG/M2 | TEMPERATURE: 97.3 F | WEIGHT: 225 LBS | HEIGHT: 71 IN | DIASTOLIC BLOOD PRESSURE: 92 MMHG | RESPIRATION RATE: 16 BRPM

## 2018-12-18 VITALS — OXYGEN SATURATION: 97 % | RESPIRATION RATE: 26 BRPM

## 2018-12-18 PROCEDURE — 6360000002 HC RX W HCPCS: Performed by: NURSE ANESTHETIST, CERTIFIED REGISTERED

## 2018-12-18 PROCEDURE — 3700000000 HC ANESTHESIA ATTENDED CARE: Performed by: INTERNAL MEDICINE

## 2018-12-18 PROCEDURE — C1773 RET DEV, INSERTABLE: HCPCS | Performed by: INTERNAL MEDICINE

## 2018-12-18 PROCEDURE — 7100000011 HC PHASE II RECOVERY - ADDTL 15 MIN: Performed by: INTERNAL MEDICINE

## 2018-12-18 PROCEDURE — 88305 TISSUE EXAM BY PATHOLOGIST: CPT

## 2018-12-18 PROCEDURE — 7100000010 HC PHASE II RECOVERY - FIRST 15 MIN: Performed by: INTERNAL MEDICINE

## 2018-12-18 PROCEDURE — 45385 COLONOSCOPY W/LESION REMOVAL: CPT | Performed by: INTERNAL MEDICINE

## 2018-12-18 PROCEDURE — 3609019800 HC COLONOSCOPY WITH SUBMUCOSAL INJECTION: Performed by: INTERNAL MEDICINE

## 2018-12-18 PROCEDURE — 2580000003 HC RX 258: Performed by: INTERNAL MEDICINE

## 2018-12-18 PROCEDURE — 2709999900 HC NON-CHARGEABLE SUPPLY: Performed by: INTERNAL MEDICINE

## 2018-12-18 PROCEDURE — 45380 COLONOSCOPY AND BIOPSY: CPT | Performed by: INTERNAL MEDICINE

## 2018-12-18 PROCEDURE — 3609010300 HC COLONOSCOPY W/BIOPSY SINGLE/MULTIPLE: Performed by: INTERNAL MEDICINE

## 2018-12-18 PROCEDURE — 3609010600 HC COLONOSCOPY POLYPECTOMY SNARE/COLD BIOPSY: Performed by: INTERNAL MEDICINE

## 2018-12-18 PROCEDURE — 3700000001 HC ADD 15 MINUTES (ANESTHESIA): Performed by: INTERNAL MEDICINE

## 2018-12-18 PROCEDURE — 45381 COLONOSCOPY SUBMUCOUS NJX: CPT | Performed by: INTERNAL MEDICINE

## 2018-12-18 RX ORDER — PROPOFOL 10 MG/ML
INJECTION, EMULSION INTRAVENOUS PRN
Status: DISCONTINUED | OUTPATIENT
Start: 2018-12-18 | End: 2018-12-18 | Stop reason: SDUPTHER

## 2018-12-18 RX ORDER — SODIUM CHLORIDE, SODIUM LACTATE, POTASSIUM CHLORIDE, CALCIUM CHLORIDE 600; 310; 30; 20 MG/100ML; MG/100ML; MG/100ML; MG/100ML
INJECTION, SOLUTION INTRAVENOUS CONTINUOUS PRN
Status: DISCONTINUED | OUTPATIENT
Start: 2018-12-18 | End: 2018-12-18 | Stop reason: HOSPADM

## 2018-12-18 RX ADMIN — PROPOFOL 25 MG: 10 INJECTION, EMULSION INTRAVENOUS at 11:15

## 2018-12-18 RX ADMIN — PROPOFOL 25 MG: 10 INJECTION, EMULSION INTRAVENOUS at 10:59

## 2018-12-18 RX ADMIN — PROPOFOL 25 MG: 10 INJECTION, EMULSION INTRAVENOUS at 11:08

## 2018-12-18 RX ADMIN — PROPOFOL 25 MG: 10 INJECTION, EMULSION INTRAVENOUS at 11:17

## 2018-12-18 RX ADMIN — PROPOFOL 25 MG: 10 INJECTION, EMULSION INTRAVENOUS at 11:19

## 2018-12-18 RX ADMIN — PROPOFOL 25 MG: 10 INJECTION, EMULSION INTRAVENOUS at 10:57

## 2018-12-18 RX ADMIN — PROPOFOL 50 MG: 10 INJECTION, EMULSION INTRAVENOUS at 10:53

## 2018-12-18 RX ADMIN — PROPOFOL 25 MG: 10 INJECTION, EMULSION INTRAVENOUS at 11:06

## 2018-12-18 RX ADMIN — PROPOFOL 25 MG: 10 INJECTION, EMULSION INTRAVENOUS at 10:55

## 2018-12-18 RX ADMIN — PROPOFOL 25 MG: 10 INJECTION, EMULSION INTRAVENOUS at 11:04

## 2018-12-18 RX ADMIN — PROPOFOL 25 MG: 10 INJECTION, EMULSION INTRAVENOUS at 11:13

## 2018-12-18 RX ADMIN — PROPOFOL 25 MG: 10 INJECTION, EMULSION INTRAVENOUS at 11:02

## 2018-12-18 RX ADMIN — PROPOFOL 25 MG: 10 INJECTION, EMULSION INTRAVENOUS at 11:01

## 2018-12-18 RX ADMIN — PROPOFOL 25 MG: 10 INJECTION, EMULSION INTRAVENOUS at 11:21

## 2018-12-18 RX ADMIN — PROPOFOL 25 MG: 10 INJECTION, EMULSION INTRAVENOUS at 11:10

## 2018-12-18 ASSESSMENT — PAIN - FUNCTIONAL ASSESSMENT: PAIN_FUNCTIONAL_ASSESSMENT: 0-10

## 2018-12-18 NOTE — ANESTHESIA PRE PROCEDURE
vascular ROS. Anesthesia Plan      TIVA     ASA 2       Induction: intravenous. Anesthetic plan and risks discussed with patient. Plan discussed with CRNA. All questions answered and agrees with plan.         Benita Yan MD   12/18/2018

## 2018-12-19 ENCOUNTER — TELEPHONE (OUTPATIENT)
Dept: GASTROENTEROLOGY | Age: 48
End: 2018-12-19

## 2019-02-14 ENCOUNTER — OFFICE VISIT (OUTPATIENT)
Dept: FAMILY MEDICINE CLINIC | Age: 49
End: 2019-02-14
Payer: COMMERCIAL

## 2019-02-14 VITALS
SYSTOLIC BLOOD PRESSURE: 138 MMHG | HEIGHT: 71 IN | BODY MASS INDEX: 32.9 KG/M2 | DIASTOLIC BLOOD PRESSURE: 84 MMHG | WEIGHT: 235 LBS | OXYGEN SATURATION: 98 % | HEART RATE: 85 BPM

## 2019-02-14 DIAGNOSIS — Z72.0 TOBACCO USE: ICD-10-CM

## 2019-02-14 DIAGNOSIS — H53.001 AMBLYOPIA OF RIGHT EYE: ICD-10-CM

## 2019-02-14 DIAGNOSIS — Z01.818 PRE-OP EXAM: ICD-10-CM

## 2019-02-14 DIAGNOSIS — K63.5 MULTIPLE POLYPS OF SIGMOID COLON: Primary | ICD-10-CM

## 2019-02-14 PROCEDURE — 99214 OFFICE O/P EST MOD 30 MIN: CPT | Performed by: FAMILY MEDICINE

## 2019-02-14 ASSESSMENT — ENCOUNTER SYMPTOMS
CONSTIPATION: 0
COUGH: 0
WHEEZING: 0
SINUS PRESSURE: 0
BACK PAIN: 0
SORE THROAT: 0
ABDOMINAL PAIN: 0
DIARRHEA: 0
TROUBLE SWALLOWING: 0
BLOOD IN STOOL: 0
SHORTNESS OF BREATH: 0
PHOTOPHOBIA: 0

## 2019-02-14 ASSESSMENT — PATIENT HEALTH QUESTIONNAIRE - PHQ9
SUM OF ALL RESPONSES TO PHQ9 QUESTIONS 1 & 2: 0
SUM OF ALL RESPONSES TO PHQ QUESTIONS 1-9: 0
1. LITTLE INTEREST OR PLEASURE IN DOING THINGS: 0
SUM OF ALL RESPONSES TO PHQ QUESTIONS 1-9: 0
2. FEELING DOWN, DEPRESSED OR HOPELESS: 0

## 2019-08-28 ENCOUNTER — OFFICE VISIT (OUTPATIENT)
Dept: FAMILY MEDICINE CLINIC | Age: 49
End: 2019-08-28
Payer: COMMERCIAL

## 2019-08-28 VITALS
BODY MASS INDEX: 32.76 KG/M2 | HEIGHT: 71 IN | WEIGHT: 234 LBS | DIASTOLIC BLOOD PRESSURE: 86 MMHG | OXYGEN SATURATION: 99 % | HEART RATE: 100 BPM | SYSTOLIC BLOOD PRESSURE: 136 MMHG

## 2019-08-28 DIAGNOSIS — M76.61 ACHILLES TENDINITIS, RIGHT LEG: Primary | ICD-10-CM

## 2019-08-28 PROCEDURE — 99213 OFFICE O/P EST LOW 20 MIN: CPT | Performed by: FAMILY MEDICINE

## 2019-08-28 NOTE — PROGRESS NOTES
current outpatient medications on file. No current facility-administered medications for this visit.

## 2019-09-05 ENCOUNTER — OFFICE VISIT (OUTPATIENT)
Dept: ORTHOPEDIC SURGERY | Age: 49
End: 2019-09-05
Payer: COMMERCIAL

## 2019-09-05 VITALS — WEIGHT: 233.91 LBS | HEIGHT: 71 IN | BODY MASS INDEX: 32.75 KG/M2

## 2019-09-05 DIAGNOSIS — R52 PAIN: Primary | ICD-10-CM

## 2019-09-05 PROCEDURE — 99243 OFF/OP CNSLTJ NEW/EST LOW 30: CPT | Performed by: ORTHOPAEDIC SURGERY

## 2019-09-05 PROCEDURE — L4361 PNEUMA/VAC WALK BOOT PRE OTS: HCPCS | Performed by: ORTHOPAEDIC SURGERY

## 2019-09-05 RX ORDER — PREDNISONE 10 MG/1
TABLET ORAL
Qty: 14 TABLET | Refills: 0 | Status: SHIPPED | OUTPATIENT
Start: 2019-09-05 | End: 2019-11-25 | Stop reason: ALTCHOICE

## 2019-09-06 ENCOUNTER — TELEPHONE (OUTPATIENT)
Dept: ORTHOPEDIC SURGERY | Age: 49
End: 2019-09-06

## 2019-09-18 ENCOUNTER — OFFICE VISIT (OUTPATIENT)
Dept: ORTHOPEDIC SURGERY | Age: 49
End: 2019-09-18
Payer: COMMERCIAL

## 2019-09-18 VITALS — HEIGHT: 71 IN | BODY MASS INDEX: 32.75 KG/M2 | WEIGHT: 233.91 LBS

## 2019-09-18 DIAGNOSIS — R52 PAIN: Primary | ICD-10-CM

## 2019-09-18 PROCEDURE — 99212 OFFICE O/P EST SF 10 MIN: CPT | Performed by: ORTHOPAEDIC SURGERY

## 2019-09-20 ENCOUNTER — HOSPITAL ENCOUNTER (OUTPATIENT)
Dept: PHYSICAL THERAPY | Age: 49
Setting detail: THERAPIES SERIES
Discharge: HOME | End: 2019-09-20
Payer: COMMERCIAL

## 2019-09-20 PROCEDURE — 97140 MANUAL THERAPY 1/> REGIONS: CPT | Performed by: PHYSICAL THERAPIST

## 2019-09-20 PROCEDURE — 97110 THERAPEUTIC EXERCISES: CPT | Performed by: PHYSICAL THERAPIST

## 2019-09-20 PROCEDURE — 97161 PT EVAL LOW COMPLEX 20 MIN: CPT | Performed by: PHYSICAL THERAPIST

## 2019-09-20 NOTE — PLAN OF CARE
[address] [facility], [address] [facility] Chidi Jones, [address] [facility] [address]  Phone: [phone]   Fax:     [phone]       Physical Therapy Certification    Dear Referring Practitioner: Castillo Andino,    We had the pleasure of evaluating the following patient for physical therapy services at [address] [place] [address]. A summary of our findings can be found in the initial assessment below. This includes our plan of care. If you have any questions or concerns regarding these findings, please do not hesitate to contact me at the office phone number checked above. Thank you for the referral.       Physician Signature:_______________________________Date:__________________  By signing above (or electronic signature), therapists plan is approved by physician      Patient: Robert Cosme   : 1970   MRN: [id number]  Referring Physician: Referring Practitioner: Castillo Andino      Evaluation Date: 2019      Medical Diagnosis Information:  Diagnosis: PT: R achilles tendinitis, Bernie's deformity   Treatment Diagnosis: R ankle achilles tenidnitis M76.61                                         Insurance information: PT Insurance Information: [employer]     Precautions/ Contra-indications: none  Latex Allergy:  [x]NO      []YES  Preferred Language for Healthcare:   [x]English       []other:    SUBJECTIVE: Patient stated complaint:Patient reports pain in ankle started a couple years ago with no known cause. Patient had tendon transfer on L, thinks he may proceed with surgery on R. Per MD, \"We will scan him and discuss FHL tendon transfer versus debridement oversewing and augmentation and will followup with me 2 weeks. \" In last month, pain has steadily increased. Patient was given walking boot, but states following meds he did not feel he needed it.     Relevant Medical History: L Achilles tendon repair mechanics with sitting, bending, and lifting   []Reduced ability to sleep   [] Reduced ability or tolerance with driving and/or computer work   [x]Reduced ability to perform lifting, carrying tasks   [x]Reduced ability to squat   [x]Reduced ability to forward bend   [x]Reduced ability to ambulate prolonged functional periods/distances/surfaces   [x]Reduced ability to ascend/descend stairs   [x]Reduced ability to run, hop, cut or jump   []other:    Participation Restrictions   []Reduced participation in self care activities   [x]Reduced participation in home management activities   [x]Reduced participation in work activities   [x]Reduced participation in social activities. [x]Reduced participation in sport/recreation activities. Classification :    []Signs/symptoms consistent with post-surgical status including decreased ROM, strength and function.    []Signs/symptoms consistent with joint sprain/strain   []Signs/symptoms consistent with patella-femoral syndrome   []Signs/symptoms consistent with knee OA/hip OA   []Signs/symptoms consistent with internal derangement of knee/Hip   []Signs/symptoms consistent with functional hip weakness/NMR control      [x]Signs/symptoms consistent with tendinitis/tendinosis    []signs/symptoms consistent with pathology which may benefit from Dry needling      []other:      Prognosis/Rehab Potential:      []Excellent   [x]Good    []Fair   []Poor    Tolerance of evaluation/treatment:    []Excellent   [x]Good    []Fair   []Poor    Physical Therapy Evaluation Complexity Justification  [x] A history of present problem with:  [] no personal factors and/or comorbidities that impact the plan of care;  [x]1-2 personal factors and/or comorbidities that impact the plan of care  []3 personal factors and/or comorbidities that impact the plan of care  [x] An examination of body systems using standardized tests and measures addressing any of the following: body structures and functions

## 2019-09-20 NOTE — FLOWSHEET NOTE
I without gait abnormality and ascend/ descend stairs with reciprocal gait. [] Progressing: [] Met: [] Not Met: [] Adjusted     5. Patient will be able to jog > 20 minutes without symptoms to return to PLOF. [] Progressing: [] Met: [] Not Met: [] Adjusted        Overall Progression Towards Functional goals/ Treatment Progress Update:  [] Patient is progressing as expected towards functional goals listed. [] Progression is slowed due to complexities/Impairments listed. [] Progression has been slowed due to co-morbidities. [x] Plan just implemented, too soon to assess goals progression <30days   [] Goals require adjustment due to lack of progress  [] Patient is not progressing as expected and requires additional follow up with physician  [] Other    Prognosis for POC: [x] Good [] Fair  [] Poor      Patient requires continued skilled intervention: [x] Yes  [] No    Treatment/Activity Tolerance:  [x] Patient able to complete treatment  [] Patient limited by fatigue  [] Patient limited by pain    [] Patient limited by other medical complications  [] Other:     PLAN: See eval  [] Continue per plan of care [] Alter current plan (see comments above)  [x] Plan of care initiated [] Hold pending MD visit [] Discharge      Electronically signed by:  Gabby Brumfield PT    Note: If patient does not return for scheduled/ recommended follow up visits, this note will serve as a discharge from care along with most recent update on progress.

## 2019-09-24 ENCOUNTER — HOSPITAL ENCOUNTER (OUTPATIENT)
Dept: PHYSICAL THERAPY | Age: 49
Setting detail: THERAPIES SERIES
Discharge: HOME | End: 2019-09-24
Payer: COMMERCIAL

## 2019-09-24 PROCEDURE — 97140 MANUAL THERAPY 1/> REGIONS: CPT | Performed by: PHYSICAL THERAPIST

## 2019-09-24 PROCEDURE — 97110 THERAPEUTIC EXERCISES: CPT | Performed by: PHYSICAL THERAPIST

## 2019-09-24 NOTE — FLOWSHEET NOTE
NMR re-education (47074)  CUES NEEDED                                                          Therapeutic Activity (04621)                                              Therapeutic Exercise and NMR EXR  [x] (09725) Provided verbal/tactile cueing for activities related to strengthening, flexibility, endurance, ROM for improvements in LE, proximal hip, and core control with self care, mobility, lifting, ambulation. [x] (33953) Provided verbal/tactile cueing for activities related to improving balance, coordination, kinesthetic sense, posture, motor skill, proprioception to assist with LE, proximal hip, and core control in self-care, mobility, lifting, ambulation and eccentric single leg control.      NMR and Therapeutic Activities:    [x] (62113 or 46591) Provided verbal/tactile cueing for activities related to improving balance, coordination, kinesthetic sense, posture, motor skill, proprioception and motor activation to allow for proper function of core, proximal hip and LE with self-care and ADLs and functional mobility.   [] (50619) Gait Re-education- Provided training and instruction to the patient for proper LE, core and proximal hip recruitment and positioning and eccentric body weight control with ambulation re-education including up and down stairs     Home Exercise Program:    [x] (03434) Reviewed/Progressed HEP activities related to strengthening, flexibility, endurance, ROM of core, proximal hip and LE for functional self-care, mobility, lifting and ambulation/stair navigation   [] (71475) Reviewed/Progressed HEP activities related to improving balance, coordination, kinesthetic sense, posture, motor skill, proprioception of core, proximal hip and LE for self-care, mobility, lifting, and ambulation/stair navigation      Manual Treatments:  PROM / STM / Oscillations-Mobs:  G-I, II, III, IV (PA's, Inf., Post.)  [x] (45229) Provided manual therapy to mobilize LE, proximal hip and/or LS spine Deficits. [] Progressing: [] Met: [] Not Met: [] Adjusted     4. Patient will return to full functional activities without increased symptoms or restriction including ability to ambulate I without gait abnormality and ascend/ descend stairs with reciprocal gait. [x] Progressing: [] Met: [] Not Met: [] Adjusted     5. Patient will be able to jog > 20 minutes without symptoms to return to PLOF. [] Progressing: [] Met: [] Not Met: [] Adjusted        Overall Progression Towards Functional goals/ Treatment Progress Update:  [x] Patient is progressing as expected towards functional goals listed. [] Progression is slowed due to complexities/Impairments listed. [] Progression has been slowed due to co-morbidities. [] Plan just implemented, too soon to assess goals progression <30days   [] Goals require adjustment due to lack of progress  [] Patient is not progressing as expected and requires additional follow up with physician  [] Other    Prognosis for POC: [x] Good [] Fair  [] Poor      Patient requires continued skilled intervention: [x] Yes  [] No    Treatment/Activity Tolerance:  [x] Patient able to complete treatment  [] Patient limited by fatigue  [] Patient limited by pain    [] Patient limited by other medical complications  [] Other:     PLAN: See eval  [x] Continue per plan of care [] Alter current plan (see comments above)  [] Plan of care initiated [] Hold pending MD visit [] Discharge      Electronically signed by:  Giovanni Irvin PT    Note: If patient does not return for scheduled/ recommended follow up visits, this note will serve as a discharge from care along with most recent update on progress.

## 2019-09-26 ENCOUNTER — HOSPITAL ENCOUNTER (OUTPATIENT)
Dept: PHYSICAL THERAPY | Age: 49
Setting detail: THERAPIES SERIES
Discharge: HOME | End: 2019-09-26
Payer: COMMERCIAL

## 2019-09-26 PROCEDURE — 97110 THERAPEUTIC EXERCISES: CPT | Performed by: PHYSICAL THERAPIST

## 2019-09-26 PROCEDURE — 97112 NEUROMUSCULAR REEDUCATION: CPT | Performed by: PHYSICAL THERAPIST

## 2019-09-26 NOTE — FLOWSHEET NOTE
Progressing: [] Met: [] Not Met: [] Adjusted     4. Patient will return to full functional activities without increased symptoms or restriction including ability to ambulate I without gait abnormality and ascend/ descend stairs with reciprocal gait. [x] Progressing: [] Met: [] Not Met: [] Adjusted     5. Patient will be able to jog > 20 minutes without symptoms to return to PLOF. [] Progressing: [] Met: [] Not Met: [] Adjusted        Overall Progression Towards Functional goals/ Treatment Progress Update:  [x] Patient is progressing as expected towards functional goals listed. [] Progression is slowed due to complexities/Impairments listed. [] Progression has been slowed due to co-morbidities. [] Plan just implemented, too soon to assess goals progression <30days   [] Goals require adjustment due to lack of progress  [] Patient is not progressing as expected and requires additional follow up with physician  [] Other    Prognosis for POC: [x] Good [] Fair  [] Poor      Patient requires continued skilled intervention: [x] Yes  [] No    Treatment/Activity Tolerance:  [x] Patient able to complete treatment  [] Patient limited by fatigue  [] Patient limited by pain    [] Patient limited by other medical complications  [] Other:     PLAN: See eval  [x] Continue per plan of care [] Alter current plan (see comments above)  [] Plan of care initiated [] Hold pending MD visit [] Discharge      Electronically signed by:  Jazmine Persaud PT    Note: If patient does not return for scheduled/ recommended follow up visits, this note will serve as a discharge from care along with most recent update on progress.

## 2019-10-01 ENCOUNTER — HOSPITAL ENCOUNTER (OUTPATIENT)
Dept: PHYSICAL THERAPY | Age: 49
Setting detail: THERAPIES SERIES
Discharge: HOME | End: 2019-10-01
Payer: COMMERCIAL

## 2019-10-01 PROCEDURE — 97110 THERAPEUTIC EXERCISES: CPT | Performed by: PHYSICAL THERAPIST

## 2019-10-01 PROCEDURE — 97112 NEUROMUSCULAR REEDUCATION: CPT | Performed by: PHYSICAL THERAPIST

## 2019-10-03 ENCOUNTER — HOSPITAL ENCOUNTER (OUTPATIENT)
Dept: PHYSICAL THERAPY | Age: 49
Setting detail: THERAPIES SERIES
Discharge: HOME | End: 2019-10-03
Payer: COMMERCIAL

## 2019-10-07 ENCOUNTER — HOSPITAL ENCOUNTER (EMERGENCY)
Age: 49
Discharge: HOME | End: 2019-10-07
Payer: COMMERCIAL

## 2019-10-07 VITALS
SYSTOLIC BLOOD PRESSURE: 138 MMHG | BODY MASS INDEX: 32.2 KG/M2 | WEIGHT: 230 LBS | RESPIRATION RATE: 16 BRPM | OXYGEN SATURATION: 98 % | HEART RATE: 92 BPM | HEIGHT: 71 IN | TEMPERATURE: 97.2 F | DIASTOLIC BLOOD PRESSURE: 85 MMHG

## 2019-10-07 DIAGNOSIS — S16.1XXA STRAIN OF NECK MUSCLE, INITIAL ENCOUNTER: Primary | ICD-10-CM

## 2019-10-07 PROCEDURE — 99282 EMERGENCY DEPT VISIT SF MDM: CPT

## 2019-10-07 PROCEDURE — 6370000000 HC RX 637 (ALT 250 FOR IP): Performed by: PHYSICIAN ASSISTANT

## 2019-10-07 RX ORDER — LIDOCAINE 4 G/G
1 PATCH TOPICAL ONCE
Status: DISCONTINUED | OUTPATIENT
Start: 2019-10-07 | End: 2019-10-07 | Stop reason: HOSPADM

## 2019-10-07 RX ORDER — NAPROXEN 500 MG/1
500 TABLET ORAL 2 TIMES DAILY
Qty: 20 TABLET | Refills: 0 | Status: SHIPPED | OUTPATIENT
Start: 2019-10-07 | End: 2019-11-25 | Stop reason: ALTCHOICE

## 2019-10-07 RX ORDER — IBUPROFEN 600 MG/1
600 TABLET ORAL ONCE
Status: COMPLETED | OUTPATIENT
Start: 2019-10-07 | End: 2019-10-07

## 2019-10-07 RX ORDER — LIDOCAINE 50 MG/G
1 PATCH TOPICAL DAILY
Qty: 30 PATCH | Refills: 0 | Status: SHIPPED | OUTPATIENT
Start: 2019-10-07 | End: 2019-11-25 | Stop reason: ALTCHOICE

## 2019-10-07 RX ORDER — CYCLOBENZAPRINE HCL 10 MG
10 TABLET ORAL 3 TIMES DAILY PRN
Qty: 20 TABLET | Refills: 0 | Status: SHIPPED | OUTPATIENT
Start: 2019-10-07 | End: 2019-10-14

## 2019-10-07 RX ADMIN — IBUPROFEN 600 MG: 600 TABLET, FILM COATED ORAL at 10:29

## 2019-10-07 ASSESSMENT — PAIN DESCRIPTION - DESCRIPTORS: DESCRIPTORS: ACHING

## 2019-10-07 ASSESSMENT — PAIN SCALES - GENERAL
PAINLEVEL_OUTOF10: 8
PAINLEVEL_OUTOF10: 8

## 2019-10-07 ASSESSMENT — ENCOUNTER SYMPTOMS
GASTROINTESTINAL NEGATIVE: 1
RESPIRATORY NEGATIVE: 1

## 2019-10-07 ASSESSMENT — PAIN DESCRIPTION - PAIN TYPE: TYPE: ACUTE PAIN

## 2019-10-07 ASSESSMENT — PAIN DESCRIPTION - LOCATION: LOCATION: NECK

## 2019-10-07 ASSESSMENT — PAIN DESCRIPTION - FREQUENCY: FREQUENCY: CONTINUOUS

## 2019-10-08 ENCOUNTER — HOSPITAL ENCOUNTER (OUTPATIENT)
Dept: PHYSICAL THERAPY | Age: 49
Setting detail: THERAPIES SERIES
Discharge: HOME | End: 2019-10-08
Payer: COMMERCIAL

## 2019-11-25 ENCOUNTER — OFFICE VISIT (OUTPATIENT)
Dept: FAMILY MEDICINE CLINIC | Age: 49
End: 2019-11-25
Payer: COMMERCIAL

## 2019-11-25 VITALS
OXYGEN SATURATION: 98 % | SYSTOLIC BLOOD PRESSURE: 136 MMHG | HEART RATE: 93 BPM | HEIGHT: 71 IN | WEIGHT: 230 LBS | DIASTOLIC BLOOD PRESSURE: 82 MMHG | BODY MASS INDEX: 32.2 KG/M2

## 2019-11-25 DIAGNOSIS — Z00.00 ANNUAL PHYSICAL EXAM: ICD-10-CM

## 2019-11-25 DIAGNOSIS — E55.9 VITAMIN D DEFICIENCY: ICD-10-CM

## 2019-11-25 DIAGNOSIS — Z00.00 ANNUAL PHYSICAL EXAM: Primary | ICD-10-CM

## 2019-11-25 DIAGNOSIS — D23.21: ICD-10-CM

## 2019-11-25 LAB
A/G RATIO: 1.6 (ref 1.1–2.2)
ALBUMIN SERPL-MCNC: 4.6 G/DL (ref 3.4–5)
ALP BLD-CCNC: 60 U/L (ref 40–129)
ALT SERPL-CCNC: 23 U/L (ref 10–40)
ANION GAP SERPL CALCULATED.3IONS-SCNC: 14 MMOL/L (ref 3–16)
AST SERPL-CCNC: 21 U/L (ref 15–37)
BASOPHILS ABSOLUTE: 0.1 K/UL (ref 0–0.2)
BASOPHILS RELATIVE PERCENT: 0.9 %
BILIRUB SERPL-MCNC: 0.5 MG/DL (ref 0–1)
BUN BLDV-MCNC: 9 MG/DL (ref 7–20)
CALCIUM SERPL-MCNC: 10 MG/DL (ref 8.3–10.6)
CHLORIDE BLD-SCNC: 98 MMOL/L (ref 99–110)
CHOLESTEROL, TOTAL: 272 MG/DL (ref 0–199)
CO2: 23 MMOL/L (ref 21–32)
CREAT SERPL-MCNC: 1 MG/DL (ref 0.9–1.3)
EOSINOPHILS ABSOLUTE: 0.2 K/UL (ref 0–0.6)
EOSINOPHILS RELATIVE PERCENT: 2.1 %
GFR AFRICAN AMERICAN: >60
GFR NON-AFRICAN AMERICAN: >60
GLOBULIN: 2.9 G/DL
GLUCOSE BLD-MCNC: 97 MG/DL (ref 70–99)
HCT VFR BLD CALC: 49.1 % (ref 40.5–52.5)
HDLC SERPL-MCNC: 42 MG/DL (ref 40–60)
HEMOGLOBIN: 16.4 G/DL (ref 13.5–17.5)
LDL CHOLESTEROL CALCULATED: 173 MG/DL
LYMPHOCYTES ABSOLUTE: 3.1 K/UL (ref 1–5.1)
LYMPHOCYTES RELATIVE PERCENT: 33.7 %
MCH RBC QN AUTO: 33.2 PG (ref 26–34)
MCHC RBC AUTO-ENTMCNC: 33.4 G/DL (ref 31–36)
MCV RBC AUTO: 99.3 FL (ref 80–100)
MONOCYTES ABSOLUTE: 0.6 K/UL (ref 0–1.3)
MONOCYTES RELATIVE PERCENT: 6.6 %
NEUTROPHILS ABSOLUTE: 5.3 K/UL (ref 1.7–7.7)
NEUTROPHILS RELATIVE PERCENT: 56.7 %
PDW BLD-RTO: 15.6 % (ref 12.4–15.4)
PLATELET # BLD: 326 K/UL (ref 135–450)
PMV BLD AUTO: 9.2 FL (ref 5–10.5)
POTASSIUM SERPL-SCNC: 5.1 MMOL/L (ref 3.5–5.1)
RBC # BLD: 4.94 M/UL (ref 4.2–5.9)
SODIUM BLD-SCNC: 135 MMOL/L (ref 136–145)
TOTAL PROTEIN: 7.5 G/DL (ref 6.4–8.2)
TRIGL SERPL-MCNC: 287 MG/DL (ref 0–150)
TSH REFLEX: 1.53 UIU/ML (ref 0.27–4.2)
VITAMIN D 25-HYDROXY: 6.2 NG/ML
VLDLC SERPL CALC-MCNC: 57 MG/DL
WBC # BLD: 9.3 K/UL (ref 4–11)

## 2019-11-25 PROCEDURE — 99396 PREV VISIT EST AGE 40-64: CPT | Performed by: FAMILY MEDICINE

## 2019-11-25 PROCEDURE — 90471 IMMUNIZATION ADMIN: CPT | Performed by: FAMILY MEDICINE

## 2019-11-25 PROCEDURE — 90686 IIV4 VACC NO PRSV 0.5 ML IM: CPT | Performed by: FAMILY MEDICINE

## 2019-11-25 ASSESSMENT — ENCOUNTER SYMPTOMS
DIARRHEA: 0
ABDOMINAL PAIN: 0
CONSTIPATION: 0

## 2019-12-05 ENCOUNTER — TELEPHONE (OUTPATIENT)
Dept: FAMILY MEDICINE CLINIC | Age: 49
End: 2019-12-05

## 2019-12-05 RX ORDER — DOXYCYCLINE HYCLATE 100 MG
100 TABLET ORAL 2 TIMES DAILY
Qty: 30 TABLET | Refills: 0 | Status: SHIPPED | OUTPATIENT
Start: 2019-12-05 | End: 2020-06-22 | Stop reason: ALTCHOICE

## 2020-01-25 RX ORDER — ATORVASTATIN CALCIUM 40 MG/1
40 TABLET, FILM COATED ORAL DAILY
Qty: 30 TABLET | Refills: 5 | Status: SHIPPED | OUTPATIENT
Start: 2020-01-25 | End: 2020-06-22 | Stop reason: SDUPTHER

## 2020-03-26 RX ORDER — ERYTHROMYCIN 5 MG/G
OINTMENT OPHTHALMIC
Qty: 1 G | Refills: 1 | Status: SHIPPED | OUTPATIENT
Start: 2020-03-26 | End: 2020-04-05

## 2020-06-22 ENCOUNTER — OFFICE VISIT (OUTPATIENT)
Dept: FAMILY MEDICINE CLINIC | Age: 50
End: 2020-06-22
Payer: COMMERCIAL

## 2020-06-22 VITALS
SYSTOLIC BLOOD PRESSURE: 142 MMHG | TEMPERATURE: 98.3 F | DIASTOLIC BLOOD PRESSURE: 86 MMHG | OXYGEN SATURATION: 97 % | BODY MASS INDEX: 33.05 KG/M2 | HEART RATE: 79 BPM | RESPIRATION RATE: 18 BRPM | WEIGHT: 237 LBS

## 2020-06-22 PROCEDURE — 99213 OFFICE O/P EST LOW 20 MIN: CPT | Performed by: NURSE PRACTITIONER

## 2020-06-22 RX ORDER — METHYLPREDNISOLONE 4 MG/1
TABLET ORAL
Qty: 1 KIT | Refills: 0 | Status: SHIPPED | OUTPATIENT
Start: 2020-06-22 | End: 2020-06-28

## 2020-06-22 RX ORDER — ATORVASTATIN CALCIUM 40 MG/1
40 TABLET, FILM COATED ORAL DAILY
Qty: 30 TABLET | Refills: 0 | Status: SHIPPED | OUTPATIENT
Start: 2020-06-22 | End: 2020-07-27

## 2020-06-22 ASSESSMENT — PATIENT HEALTH QUESTIONNAIRE - PHQ9
SUM OF ALL RESPONSES TO PHQ QUESTIONS 1-9: 0
SUM OF ALL RESPONSES TO PHQ9 QUESTIONS 1 & 2: 0
2. FEELING DOWN, DEPRESSED OR HOPELESS: 0
1. LITTLE INTEREST OR PLEASURE IN DOING THINGS: 0
SUM OF ALL RESPONSES TO PHQ QUESTIONS 1-9: 0

## 2020-06-22 ASSESSMENT — ENCOUNTER SYMPTOMS
WHEEZING: 0
CHEST TIGHTNESS: 0
COUGH: 0
ABDOMINAL PAIN: 0

## 2020-06-22 NOTE — PATIENT INSTRUCTIONS
injury. Overuse is another cause of knee pain. Other causes are climbing stairs, kneeling, and other activities that use the knee. Everyday wear and tear, especially as you get older, also can cause knee pain. Rest, along with home treatment, often relieves pain and allows your knee to heal. If you have a serious knee injury, you may need tests and treatment. Follow-up care is a key part of your treatment and safety. Be sure to make and go to all appointments, and call your doctor if you are having problems. It's also a good idea to know your test results and keep a list of the medicines you take. How can you care for yourself at home? · Be safe with medicines. Read and follow all instructions on the label. ? If the doctor gave you a prescription medicine for pain, take it as prescribed. ? If you are not taking a prescription pain medicine, ask your doctor if you can take an over-the-counter medicine. · Rest and protect your knee. Take a break from any activity that may cause pain. · Put ice or a cold pack on your knee for 10 to 20 minutes at a time. Put a thin cloth between the ice and your skin. · Prop up a sore knee on a pillow when you ice it or anytime you sit or lie down for the next 3 days. Try to keep it above the level of your heart. This will help reduce swelling. · If your knee is not swollen, you can put moist heat, a heating pad, or a warm cloth on your knee. · If your doctor recommends an elastic bandage, sleeve, or other type of support for your knee, wear it as directed. · Follow your doctor's instructions about how much weight you can put on your leg. Use a cane, crutches, or a walker as instructed. · Follow your doctor's instructions about activity during your healing process. If you can do mild exercise, slowly increase your activity. · Reach and stay at a healthy weight. Extra weight can strain the joints, especially the knees and hips, and make the pain worse.  Losing even a few pounds may help. When should you call for help? [id number] anytime you think you may need emergency care. For example, call if:  · You have symptoms of a blood clot in your lung (called a pulmonary embolism). These may include:  ? Sudden chest pain. ? Trouble breathing. ? Coughing up blood. Call your doctor now or seek immediate medical care if:  · You have severe or increasing pain. · Your leg or foot turns cold or changes color. · You cannot stand or put weight on your knee. · Your knee looks twisted or bent out of shape. · You cannot move your knee. · You have signs of infection, such as:  ? Increased pain, swelling, warmth, or redness. ? Red streaks leading from the knee. ? Pus draining from a place on your knee. ? A fever. · You have signs of a blood clot in your leg (called a deep vein thrombosis), such as:  ? Pain in your calf, back of the knee, thigh, or groin. ? Redness and swelling in your leg or groin. Watch closely for changes in your health, and be sure to contact your doctor if:  · You have tingling, weakness, or numbness in your knee. · You have any new symptoms, such as swelling. · You have bruises from a knee injury that last longer than 2 weeks. · You do not get better as expected. Where can you learn more? Go to [email or web address] org and sign in to your [employer] account. Enter K195 in the [facility] box to learn more about \"Knee Pain or Injury: Care Instructions. \"     If you do not have an account, please click on the \"Sign Up Now\" link. Current as of: June 26, 2019               Content Version: 12.5  © 0229-2938 Healthwise, Incorporated. Care instructions adapted under license by [facility] ([facility]). If you have questions about a medical condition or this instruction, always ask your healthcare professional. [address] any warranty or liability for your use of this information.

## 2020-06-22 NOTE — PROGRESS NOTES
6/22/2020    This is a 54 y.o. male   Chief Complaint   Patient presents with    Knee Injury     pt twisted his right knee while trying to put together a massage chair. states the pain seems to be getting a little better. Patrick Darling is here for acute right knee pain. It started 1 week ago as he was twisting to get a chair out of a box. Hedid not have a pop but started to have pain and swelling. The swelling was minimal.  He has been icing it, resting, using Ace wrap and he started taking Aleve twice daily 3 days ago. Since that time it is starting to feel better. But he still does have pain primarily with going up and down steps. Patient Active Problem List   Diagnosis    Amblyopia of right eye    Hemorrhoids    Acute pain of right knee    Polyp of colon       Current Outpatient Medications   Medication Sig Dispense Refill    atorvastatin (LIPITOR) 40 MG tablet Take 1 tablet by mouth daily 30 tablet 0    Cholecalciferol 1.25 MG (93965 UT) TABS Take 1 tablet twice weekly for vitamin D 8 tablet 0    methylPREDNISolone (MEDROL DOSEPACK) 4 MG tablet Take by mouth as directed 1 kit 0     No current facility-administered medications for this visit. No Known Allergies    BP (!) 142/86 (Site: Left Upper Arm, Position: Sitting)   Pulse 79   Temp 98.3 °F (36.8 °C) (Infrared)   Resp 18   Wt 237 lb (107.5 kg)   SpO2 97%   BMI 33.05 kg/m²     Social History     Tobacco Use    Smoking status: Current Every Day Smoker     Packs/day: 1.00     Types: Cigarettes    Smokeless tobacco: Never Used   Substance Use Topics    Alcohol use: Yes     Alcohol/week: 0.0 standard drinks     Comment: occas. Review of Systems   Constitutional: Positive for activity change. Negative for fever. Respiratory: Negative for cough, chest tightness and wheezing. Cardiovascular: Negative for chest pain, palpitations and leg swelling.         Hyperlipidemia on atorvastatin, blood pressure elevated today denies chest pain palpitations or shortness of breath. Gastrointestinal: Negative for abdominal pain. Musculoskeletal: Positive for arthralgias, gait problem and joint swelling. Negative for myalgias and neck stiffness. Skin: Negative. Neurological: Negative for weakness and numbness. Physical Exam  Vitals signs and nursing note reviewed. Constitutional:       General: He is not in acute distress. Appearance: He is well-developed. He is not diaphoretic. HENT:      Head: Normocephalic and atraumatic. Right Ear: External ear normal.      Left Ear: External ear normal.      Nose: Nose normal.      Mouth/Throat:      Pharynx: No oropharyngeal exudate. Eyes:      General:         Right eye: No discharge. Left eye: No discharge. Conjunctiva/sclera: Conjunctivae normal.   Neck:      Musculoskeletal: Normal range of motion and neck supple. Cardiovascular:      Rate and Rhythm: Normal rate and regular rhythm. Heart sounds: Normal heart sounds. No murmur. No friction rub. No gallop. Pulmonary:      Effort: Pulmonary effort is normal. No respiratory distress. Breath sounds: Normal breath sounds. No wheezing or rales. Abdominal:      General: Bowel sounds are normal. There is no distension. Palpations: Abdomen is soft. Tenderness: There is no abdominal tenderness. Musculoskeletal: Normal range of motion. Right knee: He exhibits effusion (subpatellar, minimal). He exhibits normal range of motion, no erythema, normal alignment, no LCL laxity, no bony tenderness, normal meniscus and no MCL laxity. Tenderness (to each side of lower patella) found. Comments: Negative landon   Lymphadenopathy:      Cervical: No cervical adenopathy. Skin:     General: Skin is warm and dry. Coloration: Skin is not pale. Findings: No erythema or rash. Neurological:      Mental Status: He is alert and oriented to person, place, and time.       Motor: No

## 2020-07-22 ENCOUNTER — OFFICE VISIT (OUTPATIENT)
Dept: FAMILY MEDICINE CLINIC | Age: 50
End: 2020-07-22
Payer: COMMERCIAL

## 2020-07-22 VITALS
BODY MASS INDEX: 33.5 KG/M2 | SYSTOLIC BLOOD PRESSURE: 142 MMHG | HEART RATE: 102 BPM | DIASTOLIC BLOOD PRESSURE: 80 MMHG | HEIGHT: 70 IN | RESPIRATION RATE: 14 BRPM | WEIGHT: 234 LBS | OXYGEN SATURATION: 96 % | TEMPERATURE: 98.4 F

## 2020-07-22 PROBLEM — K63.5 HYPERPLASTIC POLYP OF SIGMOID COLON: Status: ACTIVE | Noted: 2019-01-15

## 2020-07-22 PROCEDURE — 99214 OFFICE O/P EST MOD 30 MIN: CPT | Performed by: FAMILY MEDICINE

## 2020-07-22 RX ORDER — CEPHALEXIN 500 MG/1
500 CAPSULE ORAL 3 TIMES DAILY
Qty: 60 CAPSULE | Refills: 2 | Status: SHIPPED | OUTPATIENT
Start: 2020-07-22 | End: 2020-08-20 | Stop reason: SDUPTHER

## 2020-07-22 RX ORDER — METHOCARBAMOL 750 MG/1
TABLET ORAL
COMMUNITY
Start: 2020-06-27 | End: 2020-07-22

## 2020-07-22 ASSESSMENT — PATIENT HEALTH QUESTIONNAIRE - PHQ9
SUM OF ALL RESPONSES TO PHQ QUESTIONS 1-9: 0
2. FEELING DOWN, DEPRESSED OR HOPELESS: 0
SUM OF ALL RESPONSES TO PHQ QUESTIONS 1-9: 0
SUM OF ALL RESPONSES TO PHQ9 QUESTIONS 1 & 2: 0
1. LITTLE INTEREST OR PLEASURE IN DOING THINGS: 0

## 2020-07-22 ASSESSMENT — ENCOUNTER SYMPTOMS: RESPIRATORY NEGATIVE: 1

## 2020-07-22 NOTE — PROGRESS NOTES
Subjective:      Patient ID: Joseph Oliver is a 54 y.o. y.o. male. Here to follow up cholesterol  Started Lipitor in the fall. No SE with the meds    HPI      Chief Complaint   Patient presents with    Hypertension     wants to discuss boils        No Known Allergies    Past Medical History:   Diagnosis Date    Achilles tendonitis 2011    dr Polo Ochoa of left hip 2011    Childhood asthma     childhood- no adult sx    History of colon surgery spring 2019    resection for large polyp    Strain of left knee     Dr Belen Forman       Past Surgical History:   Procedure Laterality Date    ANKLE ARTHROSCOPY Left 2012    Dr Easton Smallwood  03/2019    sigmoid resection- polyposis / TICs    COLONOSCOPY N/A 12/18/2018    COLONOSCOPY POLYPECTOMY SNARE/COLD BIOPSY performed by Emperatriz Galindo MD at [address] [place] [address] COLONOSCOPY N/A 12/18/2018    COLONOSCOPY WITH BIOPSY performed by Emperatriz Galindo MD at [place] [address]  12/18/2018    COLONOSCOPY SUBMUCOSAL/BOTOX INJECTION performed by Emperatriz Galindo MD at [facility]. [address] History     Socioeconomic History    Marital status:      Spouse name: Not on file    Number of children: Not on file    Years of education: Not on file    Highest education level: Not on file   Occupational History    Occupation: bank    Social Needs    Financial resource strain: Not on file    Food insecurity     Worry: Not on file     Inability: Not on file    Transportation needs     Medical: Not on file     Non-medical: Not on file   Tobacco Use    Smoking status: Current Every Day Smoker     Packs/day: 1.00     Types: Cigarettes    Smokeless tobacco: Never Used   Substance and Sexual Activity    Alcohol use: Yes     Alcohol/week: 0.0 standard drinks     Comment: occas.      Drug use: No    Sexual activity: Not Currently   Lifestyle    Physical activity Days per week: Not on file     Minutes per session: Not on file    Stress: Not on file   Relationships    Social connections     Talks on phone: Not on file     Gets together: Not on file     Attends Jainism service: Not on file     Active member of club or organization: Not on file     Attends meetings of clubs or organizations: Not on file     Relationship status: Not on file    Intimate partner violence     Fear of current or ex partner: Not on file     Emotionally abused: Not on file     Physically abused: Not on file     Forced sexual activity: Not on file   Other Topics Concern    Not on file   Social History Narrative    Not on file       Family History   Problem Relation Age of Onset    Cancer Mother         lung cancer-   2019    Diabetes Neg Hx     Heart Disease Neg Hx        Vitals:    20 1153   BP: (!) 142/80   Pulse:    Resp:    Temp:    SpO2:        Wt Readings from Last 3 Encounters:   20 234 lb (106.1 kg)   20 237 lb (107.5 kg)   19 230 lb (104.3 kg)       Review of Systems   Constitutional: Negative. HENT: Negative for sore throat and trouble swallowing. Respiratory: Negative. Cardiovascular: Negative. Gastrointestinal:        Bowels are OK post surgery. Genitourinary:        BTW-  Some erection issues. trouble maintaining. Libido is the same. Denies sig stress issues / new issues   Skin:        Keeps having boils. We tried to do suppression antibiotic but got interrupted by colon surgery last year. Now recurring. Sweaty areas and hairy area-  Axillae and groin       Neurological: Negative. Psychiatric/Behavioral: Negative for dysphoric mood and sleep disturbance. The patient is not nervous/anxious. Objective:   Physical Exam  Vitals signs reviewed. Constitutional:       Appearance: He is not ill-appearing. Cardiovascular:      Rate and Rhythm: Normal rate and regular rhythm. Heart sounds: Normal heart sounds. Pulmonary:      Effort: Pulmonary effort is normal.      Breath sounds: Normal breath sounds. Musculoskeletal:      Right lower leg: No edema. Left lower leg: No edema. Skin:     General: Skin is warm and dry. Comments: carbuncles    Neurological:      General: No focal deficit present. Mental Status: He is alert and oriented to person, place, and time. Psychiatric:         Mood and Affect: Mood normal.         Behavior: Behavior normal.         Thought Content: Thought content normal.         Assessment:      Hyperlipidemia    ED   Plan:   CMP. Lipids Vit D    Probable suppression antibiotic for the boils. Labs pending. ED discussed some. Current Outpatient Medications   Medication Sig Dispense Refill    atorvastatin (LIPITOR) 40 MG tablet Take 1 tablet by mouth daily 30 tablet 0    Cholecalciferol 1.25 MG (59476 UT) TABS Take 1 tablet twice weekly for vitamin D 8 tablet 0     No current facility-administered medications for this visit.

## 2020-07-23 LAB
A/G RATIO: 1.6 (ref 1.1–2.2)
ALBUMIN SERPL-MCNC: 4.7 G/DL (ref 3.4–5)
ALP BLD-CCNC: 64 U/L (ref 40–129)
ALT SERPL-CCNC: 29 U/L (ref 10–40)
ANION GAP SERPL CALCULATED.3IONS-SCNC: 17 MMOL/L (ref 3–16)
AST SERPL-CCNC: 32 U/L (ref 15–37)
BILIRUB SERPL-MCNC: 0.7 MG/DL (ref 0–1)
BUN BLDV-MCNC: 6 MG/DL (ref 7–20)
CALCIUM SERPL-MCNC: 10.1 MG/DL (ref 8.3–10.6)
CHLORIDE BLD-SCNC: 100 MMOL/L (ref 99–110)
CHOLESTEROL, TOTAL: 156 MG/DL (ref 0–199)
CO2: 23 MMOL/L (ref 21–32)
CREAT SERPL-MCNC: 1.1 MG/DL (ref 0.9–1.3)
GFR AFRICAN AMERICAN: >60
GFR NON-AFRICAN AMERICAN: >60
GLOBULIN: 2.9 G/DL
GLUCOSE BLD-MCNC: 101 MG/DL (ref 70–99)
HDLC SERPL-MCNC: 38 MG/DL (ref 40–60)
LDL CHOLESTEROL CALCULATED: ABNORMAL MG/DL
LDL CHOLESTEROL DIRECT: 73 MG/DL
POTASSIUM SERPL-SCNC: 4.7 MMOL/L (ref 3.5–5.1)
SODIUM BLD-SCNC: 140 MMOL/L (ref 136–145)
TOTAL PROTEIN: 7.6 G/DL (ref 6.4–8.2)
TRIGL SERPL-MCNC: 420 MG/DL (ref 0–150)
VITAMIN D 25-HYDROXY: 89.3 NG/ML
VLDLC SERPL CALC-MCNC: ABNORMAL MG/DL

## 2020-07-24 LAB
SEX HORMONE BINDING GLOBULIN: 31 NMOL/L (ref 11–80)
TESTOSTERONE FREE-NONMALE: 66.7 PG/ML (ref 47–244)
TESTOSTERONE TOTAL: 324 NG/DL (ref 220–1000)

## 2020-07-25 ASSESSMENT — ENCOUNTER SYMPTOMS
SORE THROAT: 0
TROUBLE SWALLOWING: 0

## 2020-08-21 RX ORDER — CEPHALEXIN 500 MG/1
500 CAPSULE ORAL 3 TIMES DAILY
Qty: 60 CAPSULE | Refills: 2 | Status: SHIPPED | OUTPATIENT
Start: 2020-08-21 | End: 2021-11-17

## 2020-09-28 RX ORDER — DOXYCYCLINE HYCLATE 100 MG
100 TABLET ORAL 2 TIMES DAILY
Qty: 30 TABLET | Refills: 1 | Status: SHIPPED | OUTPATIENT
Start: 2020-09-28 | End: 2020-10-14 | Stop reason: SDUPTHER

## 2020-09-28 NOTE — TELEPHONE ENCOUNTER
Pt called said he took all his antibiotics and is still not any better is asking for a different antibiotic to be called into [facility]

## 2020-10-01 ENCOUNTER — APPOINTMENT (OUTPATIENT)
Dept: CT IMAGING | Age: 50
End: 2020-10-01
Payer: COMMERCIAL

## 2020-10-01 ENCOUNTER — HOSPITAL ENCOUNTER (OUTPATIENT)
Age: 50
Setting detail: SURGERY ADMIT
Discharge: HOME | End: 2020-10-01
Attending: EMERGENCY MEDICINE | Admitting: SURGERY
Payer: COMMERCIAL

## 2020-10-01 ENCOUNTER — ANESTHESIA (OUTPATIENT)
Dept: OPERATING ROOM | Age: 50
End: 2020-10-01
Payer: COMMERCIAL

## 2020-10-01 ENCOUNTER — ANESTHESIA EVENT (OUTPATIENT)
Dept: OPERATING ROOM | Age: 50
End: 2020-10-01
Payer: COMMERCIAL

## 2020-10-01 VITALS
OXYGEN SATURATION: 100 % | DIASTOLIC BLOOD PRESSURE: 90 MMHG | SYSTOLIC BLOOD PRESSURE: 104 MMHG | RESPIRATION RATE: 10 BRPM

## 2020-10-01 VITALS
WEIGHT: 230 LBS | HEART RATE: 102 BPM | RESPIRATION RATE: 16 BRPM | OXYGEN SATURATION: 93 % | HEIGHT: 70 IN | TEMPERATURE: 99.9 F | SYSTOLIC BLOOD PRESSURE: 124 MMHG | BODY MASS INDEX: 32.93 KG/M2 | DIASTOLIC BLOOD PRESSURE: 70 MMHG

## 2020-10-01 LAB
A/G RATIO: 1.2 (ref 1.1–2.2)
ALBUMIN SERPL-MCNC: 4.4 G/DL (ref 3.4–5)
ALP BLD-CCNC: 74 U/L (ref 40–129)
ALT SERPL-CCNC: 18 U/L (ref 10–40)
ANION GAP SERPL CALCULATED.3IONS-SCNC: 9 MMOL/L (ref 3–16)
AST SERPL-CCNC: 18 U/L (ref 15–37)
BASOPHILS ABSOLUTE: 0.1 K/UL (ref 0–0.2)
BASOPHILS RELATIVE PERCENT: 0.5 %
BILIRUB SERPL-MCNC: 0.8 MG/DL (ref 0–1)
BUN BLDV-MCNC: 11 MG/DL (ref 7–20)
C-REACTIVE PROTEIN: 76.9 MG/L (ref 0–5.1)
CALCIUM SERPL-MCNC: 9.7 MG/DL (ref 8.3–10.6)
CHLORIDE BLD-SCNC: 97 MMOL/L (ref 99–110)
CO2: 25 MMOL/L (ref 21–32)
CREAT SERPL-MCNC: 1.2 MG/DL (ref 0.9–1.3)
EOSINOPHILS ABSOLUTE: 0.2 K/UL (ref 0–0.6)
EOSINOPHILS RELATIVE PERCENT: 1.5 %
GFR AFRICAN AMERICAN: >60
GFR NON-AFRICAN AMERICAN: >60
GLOBULIN: 3.7 G/DL
GLUCOSE BLD-MCNC: 112 MG/DL (ref 70–99)
HCT VFR BLD CALC: 47.4 % (ref 40.5–52.5)
HEMOGLOBIN: 16 G/DL (ref 13.5–17.5)
LACTIC ACID: 1.3 MMOL/L (ref 0.4–2)
LYMPHOCYTES ABSOLUTE: 2.6 K/UL (ref 1–5.1)
LYMPHOCYTES RELATIVE PERCENT: 20.9 %
MCH RBC QN AUTO: 32.9 PG (ref 26–34)
MCHC RBC AUTO-ENTMCNC: 33.8 G/DL (ref 31–36)
MCV RBC AUTO: 97.5 FL (ref 80–100)
MONOCYTES ABSOLUTE: 1 K/UL (ref 0–1.3)
MONOCYTES RELATIVE PERCENT: 8.1 %
NEUTROPHILS ABSOLUTE: 8.7 K/UL (ref 1.7–7.7)
NEUTROPHILS RELATIVE PERCENT: 69 %
PDW BLD-RTO: 14.8 % (ref 12.4–15.4)
PLATELET # BLD: 300 K/UL (ref 135–450)
PMV BLD AUTO: 8.3 FL (ref 5–10.5)
POTASSIUM REFLEX MAGNESIUM: 5.3 MMOL/L (ref 3.5–5.1)
RBC # BLD: 4.87 M/UL (ref 4.2–5.9)
SEDIMENTATION RATE, ERYTHROCYTE: 46 MM/HR (ref 0–15)
SODIUM BLD-SCNC: 131 MMOL/L (ref 136–145)
TOTAL PROTEIN: 8.1 G/DL (ref 6.4–8.2)
WBC # BLD: 12.6 K/UL (ref 4–11)

## 2020-10-01 PROCEDURE — 2709999900 HC NON-CHARGEABLE SUPPLY: Performed by: SURGERY

## 2020-10-01 PROCEDURE — 96375 TX/PRO/DX INJ NEW DRUG ADDON: CPT

## 2020-10-01 PROCEDURE — 87015 SPECIMEN INFECT AGNT CONCNTJ: CPT

## 2020-10-01 PROCEDURE — 85652 RBC SED RATE AUTOMATED: CPT

## 2020-10-01 PROCEDURE — 87206 SMEAR FLUORESCENT/ACID STAI: CPT

## 2020-10-01 PROCEDURE — 87102 FUNGUS ISOLATION CULTURE: CPT

## 2020-10-01 PROCEDURE — 3700000000 HC ANESTHESIA ATTENDED CARE: Performed by: SURGERY

## 2020-10-01 PROCEDURE — 2580000003 HC RX 258: Performed by: SURGERY

## 2020-10-01 PROCEDURE — 7100000001 HC PACU RECOVERY - ADDTL 15 MIN: Performed by: SURGERY

## 2020-10-01 PROCEDURE — 6360000004 HC RX CONTRAST MEDICATION: Performed by: EMERGENCY MEDICINE

## 2020-10-01 PROCEDURE — 6360000002 HC RX W HCPCS: Performed by: NURSE ANESTHETIST, CERTIFIED REGISTERED

## 2020-10-01 PROCEDURE — 87040 BLOOD CULTURE FOR BACTERIA: CPT

## 2020-10-01 PROCEDURE — 3600000012 HC SURGERY LEVEL 2 ADDTL 15MIN: Performed by: SURGERY

## 2020-10-01 PROCEDURE — 6370000000 HC RX 637 (ALT 250 FOR IP): Performed by: NURSE PRACTITIONER

## 2020-10-01 PROCEDURE — 6360000002 HC RX W HCPCS: Performed by: NURSE PRACTITIONER

## 2020-10-01 PROCEDURE — 80053 COMPREHEN METABOLIC PANEL: CPT

## 2020-10-01 PROCEDURE — 2580000003 HC RX 258: Performed by: NURSE ANESTHETIST, CERTIFIED REGISTERED

## 2020-10-01 PROCEDURE — 87075 CULTR BACTERIA EXCEPT BLOOD: CPT

## 2020-10-01 PROCEDURE — 87077 CULTURE AEROBIC IDENTIFY: CPT

## 2020-10-01 PROCEDURE — 96365 THER/PROPH/DIAG IV INF INIT: CPT

## 2020-10-01 PROCEDURE — 74177 CT ABD & PELVIS W/CONTRAST: CPT

## 2020-10-01 PROCEDURE — 87205 SMEAR GRAM STAIN: CPT

## 2020-10-01 PROCEDURE — 2500000003 HC RX 250 WO HCPCS: Performed by: SURGERY

## 2020-10-01 PROCEDURE — 2580000003 HC RX 258: Performed by: EMERGENCY MEDICINE

## 2020-10-01 PROCEDURE — 3600000002 HC SURGERY LEVEL 2 BASE: Performed by: SURGERY

## 2020-10-01 PROCEDURE — 83605 ASSAY OF LACTIC ACID: CPT

## 2020-10-01 PROCEDURE — 2500000003 HC RX 250 WO HCPCS: Performed by: NURSE ANESTHETIST, CERTIFIED REGISTERED

## 2020-10-01 PROCEDURE — 99220 PR INITIAL OBSERVATION CARE/DAY 70 MINUTES: CPT | Performed by: SURGERY

## 2020-10-01 PROCEDURE — 2580000003 HC RX 258: Performed by: NURSE PRACTITIONER

## 2020-10-01 PROCEDURE — 87116 MYCOBACTERIA CULTURE: CPT

## 2020-10-01 PROCEDURE — 86140 C-REACTIVE PROTEIN: CPT

## 2020-10-01 PROCEDURE — 7100000010 HC PHASE II RECOVERY - FIRST 15 MIN: Performed by: SURGERY

## 2020-10-01 PROCEDURE — 6360000002 HC RX W HCPCS: Performed by: EMERGENCY MEDICINE

## 2020-10-01 PROCEDURE — 7100000011 HC PHASE II RECOVERY - ADDTL 15 MIN: Performed by: SURGERY

## 2020-10-01 PROCEDURE — 87070 CULTURE OTHR SPECIMN AEROBIC: CPT

## 2020-10-01 PROCEDURE — 10061 I&D ABSCESS COMP/MULTIPLE: CPT | Performed by: SURGERY

## 2020-10-01 PROCEDURE — 7100000000 HC PACU RECOVERY - FIRST 15 MIN: Performed by: SURGERY

## 2020-10-01 PROCEDURE — 87076 CULTURE ANAEROBE IDENT EACH: CPT

## 2020-10-01 PROCEDURE — 99284 EMERGENCY DEPT VISIT MOD MDM: CPT

## 2020-10-01 PROCEDURE — 87185 SC STD ENZYME DETCJ PER NZM: CPT

## 2020-10-01 PROCEDURE — 85025 COMPLETE CBC W/AUTO DIFF WBC: CPT

## 2020-10-01 PROCEDURE — 3700000001 HC ADD 15 MINUTES (ANESTHESIA): Performed by: SURGERY

## 2020-10-01 RX ORDER — PROPOFOL 10 MG/ML
INJECTION, EMULSION INTRAVENOUS PRN
Status: DISCONTINUED | OUTPATIENT
Start: 2020-10-01 | End: 2020-10-01 | Stop reason: SDUPTHER

## 2020-10-01 RX ORDER — DEXAMETHASONE SODIUM PHOSPHATE 4 MG/ML
INJECTION, SOLUTION INTRA-ARTICULAR; INTRALESIONAL; INTRAMUSCULAR; INTRAVENOUS; SOFT TISSUE PRN
Status: DISCONTINUED | OUTPATIENT
Start: 2020-10-01 | End: 2020-10-01 | Stop reason: SDUPTHER

## 2020-10-01 RX ORDER — 0.9 % SODIUM CHLORIDE 0.9 %
30 INTRAVENOUS SOLUTION INTRAVENOUS ONCE
Status: DISCONTINUED | OUTPATIENT
Start: 2020-10-01 | End: 2020-10-01

## 2020-10-01 RX ORDER — ONDANSETRON 2 MG/ML
4 INJECTION INTRAMUSCULAR; INTRAVENOUS ONCE
Status: COMPLETED | OUTPATIENT
Start: 2020-10-01 | End: 2020-10-01

## 2020-10-01 RX ORDER — OXYCODONE HYDROCHLORIDE AND ACETAMINOPHEN 5; 325 MG/1; MG/1
1 TABLET ORAL ONCE
Status: DISCONTINUED | OUTPATIENT
Start: 2020-10-01 | End: 2020-10-01

## 2020-10-01 RX ORDER — MORPHINE SULFATE 4 MG/ML
4 INJECTION, SOLUTION INTRAMUSCULAR; INTRAVENOUS ONCE
Status: COMPLETED | OUTPATIENT
Start: 2020-10-01 | End: 2020-10-01

## 2020-10-01 RX ORDER — ACETAMINOPHEN 325 MG/1
325 TABLET ORAL ONCE
Status: COMPLETED | OUTPATIENT
Start: 2020-10-01 | End: 2020-10-01

## 2020-10-01 RX ORDER — 0.9 % SODIUM CHLORIDE 0.9 %
30 INTRAVENOUS SOLUTION INTRAVENOUS ONCE
Status: COMPLETED | OUTPATIENT
Start: 2020-10-01 | End: 2020-10-01

## 2020-10-01 RX ORDER — MIDAZOLAM HYDROCHLORIDE 1 MG/ML
INJECTION INTRAMUSCULAR; INTRAVENOUS PRN
Status: DISCONTINUED | OUTPATIENT
Start: 2020-10-01 | End: 2020-10-01 | Stop reason: SDUPTHER

## 2020-10-01 RX ORDER — SODIUM CHLORIDE 0.9 % (FLUSH) 0.9 %
10 SYRINGE (ML) INJECTION PRN
Status: DISCONTINUED | OUTPATIENT
Start: 2020-10-01 | End: 2020-10-01 | Stop reason: HOSPADM

## 2020-10-01 RX ORDER — MAGNESIUM HYDROXIDE 1200 MG/15ML
LIQUID ORAL CONTINUOUS PRN
Status: COMPLETED | OUTPATIENT
Start: 2020-10-01 | End: 2020-10-01

## 2020-10-01 RX ORDER — OXYCODONE HYDROCHLORIDE 5 MG/1
5 TABLET ORAL EVERY 6 HOURS PRN
Qty: 12 TABLET | Refills: 0 | Status: SHIPPED | OUTPATIENT
Start: 2020-10-01 | End: 2020-10-04

## 2020-10-01 RX ORDER — ONDANSETRON 2 MG/ML
INJECTION INTRAMUSCULAR; INTRAVENOUS PRN
Status: DISCONTINUED | OUTPATIENT
Start: 2020-10-01 | End: 2020-10-01 | Stop reason: SDUPTHER

## 2020-10-01 RX ORDER — LIDOCAINE HYDROCHLORIDE 20 MG/ML
INJECTION, SOLUTION INFILTRATION; PERINEURAL PRN
Status: DISCONTINUED | OUTPATIENT
Start: 2020-10-01 | End: 2020-10-01 | Stop reason: SDUPTHER

## 2020-10-01 RX ORDER — SODIUM CHLORIDE 0.9 % (FLUSH) 0.9 %
10 SYRINGE (ML) INJECTION EVERY 12 HOURS SCHEDULED
Status: DISCONTINUED | OUTPATIENT
Start: 2020-10-01 | End: 2020-10-01 | Stop reason: HOSPADM

## 2020-10-01 RX ORDER — BUPIVACAINE HYDROCHLORIDE AND EPINEPHRINE 5; 5 MG/ML; UG/ML
INJECTION, SOLUTION PERINEURAL PRN
Status: DISCONTINUED | OUTPATIENT
Start: 2020-10-01 | End: 2020-10-01 | Stop reason: ALTCHOICE

## 2020-10-01 RX ORDER — SODIUM CHLORIDE 9 MG/ML
INJECTION, SOLUTION INTRAVENOUS CONTINUOUS PRN
Status: DISCONTINUED | OUTPATIENT
Start: 2020-10-01 | End: 2020-10-01 | Stop reason: SDUPTHER

## 2020-10-01 RX ORDER — FENTANYL CITRATE 50 UG/ML
INJECTION, SOLUTION INTRAMUSCULAR; INTRAVENOUS PRN
Status: DISCONTINUED | OUTPATIENT
Start: 2020-10-01 | End: 2020-10-01 | Stop reason: SDUPTHER

## 2020-10-01 RX ORDER — 0.9 % SODIUM CHLORIDE 0.9 %
1000 INTRAVENOUS SOLUTION INTRAVENOUS ONCE
Status: DISCONTINUED | OUTPATIENT
Start: 2020-10-01 | End: 2020-10-01

## 2020-10-01 RX ADMIN — IOPAMIDOL 75 ML: 755 INJECTION, SOLUTION INTRAVENOUS at 14:39

## 2020-10-01 RX ADMIN — LIDOCAINE HYDROCHLORIDE 100 MG: 20 INJECTION, SOLUTION INFILTRATION; PERINEURAL at 16:58

## 2020-10-01 RX ADMIN — DEXAMETHASONE SODIUM PHOSPHATE 8 MG: 4 INJECTION, SOLUTION INTRAMUSCULAR; INTRAVENOUS at 17:04

## 2020-10-01 RX ADMIN — PROPOFOL 200 MG: 10 INJECTION, EMULSION INTRAVENOUS at 16:58

## 2020-10-01 RX ADMIN — FENTANYL CITRATE 50 MCG: 50 INJECTION INTRAMUSCULAR; INTRAVENOUS at 17:04

## 2020-10-01 RX ADMIN — MORPHINE SULFATE 4 MG: 4 INJECTION, SOLUTION INTRAMUSCULAR; INTRAVENOUS at 14:22

## 2020-10-01 RX ADMIN — ONDANSETRON 4 MG: 2 INJECTION INTRAMUSCULAR; INTRAVENOUS at 14:25

## 2020-10-01 RX ADMIN — MIDAZOLAM HYDROCHLORIDE 2 MG: 2 INJECTION, SOLUTION INTRAMUSCULAR; INTRAVENOUS at 16:58

## 2020-10-01 RX ADMIN — VANCOMYCIN HYDROCHLORIDE 2500 MG: 1 INJECTION, POWDER, LYOPHILIZED, FOR SOLUTION INTRAVENOUS at 15:09

## 2020-10-01 RX ADMIN — ACETAMINOPHEN 325 MG: 325 TABLET ORAL at 14:22

## 2020-10-01 RX ADMIN — ONDANSETRON 4 MG: 2 INJECTION INTRAMUSCULAR; INTRAVENOUS at 17:04

## 2020-10-01 RX ADMIN — FENTANYL CITRATE 50 MCG: 50 INJECTION INTRAMUSCULAR; INTRAVENOUS at 16:58

## 2020-10-01 RX ADMIN — SODIUM CHLORIDE 2190 ML: 9 INJECTION, SOLUTION INTRAVENOUS at 14:08

## 2020-10-01 RX ADMIN — SODIUM CHLORIDE: 9 INJECTION, SOLUTION INTRAVENOUS at 16:52

## 2020-10-01 ASSESSMENT — PULMONARY FUNCTION TESTS
PIF_VALUE: 3
PIF_VALUE: 4
PIF_VALUE: 5
PIF_VALUE: 5
PIF_VALUE: 16
PIF_VALUE: 2
PIF_VALUE: 3
PIF_VALUE: 6
PIF_VALUE: 2
PIF_VALUE: 2
PIF_VALUE: 6
PIF_VALUE: 5
PIF_VALUE: 2
PIF_VALUE: 5
PIF_VALUE: 5
PIF_VALUE: 2
PIF_VALUE: 2
PIF_VALUE: 3
PIF_VALUE: 20
PIF_VALUE: 2
PIF_VALUE: 6
PIF_VALUE: 2
PIF_VALUE: 2
PIF_VALUE: 5
PIF_VALUE: 6
PIF_VALUE: 5
PIF_VALUE: 5
PIF_VALUE: 2
PIF_VALUE: 3

## 2020-10-01 ASSESSMENT — PAIN SCALES - GENERAL
PAINLEVEL_OUTOF10: 8
PAINLEVEL_OUTOF10: 8

## 2020-10-01 ASSESSMENT — LIFESTYLE VARIABLES: SMOKING_STATUS: 1

## 2020-10-01 ASSESSMENT — PAIN DESCRIPTION - PAIN TYPE: TYPE: ACUTE PAIN

## 2020-10-01 NOTE — ED NOTES

## 2020-10-01 NOTE — H&P
Allergies:  Patient has no known allergies. Social History:   TOBACCO:  yes  ETOH:   no    Family History:       Problem Relation Age of Onset    Cancer Mother         lung cancer-   2019    Diabetes Neg Hx     Heart Disease Neg Hx        REVIEW OF SYSTEMS:    CONSTITUTIONAL:  positive for  chills  HEENT:  Negative  RESPIRATORY:  negative  CARDIOVASCULAR:  negative  GASTROINTESTINAL:  positive for abdominal pain  GENITOURINARY:  negative  HEMATOLOGIC/LYMPHATIC:  negative  ENDOCRINE:  Negative  NEUROLOGICAL:  Negative  * All other ROS reviewed and negative. PHYSICAL EXAM:    VITALS:  /79   Pulse 96   Temp 99.9 °F (37.7 °C) (Oral)   Resp 16   Ht 5' 10\" (1.778 m)   Wt 230 lb (104.3 kg)   SpO2 98%   BMI 33.00 kg/m²   INTAKE/OUTPUT:   No intake/output data recorded. No intake/output data recorded. CONSTITUTIONAL:  awake, alert, no apparent distress and mildly obese  ENT:  normocephalic, without obvious abnormality  NECK:  supple, symmetrical, trachea midline   LUNGS:  no crackles or wheezing  CARDIOVASCULAR:  regular rate and rhythm and no murmur noted  ABDOMEN:  scars noted lower midline, normal bowel sounds, soft, non-distended, tenderness noted lower midline, voluntary guarding absent, no masses palpated and abscess lower midline with some purulent drainage  MUSCULOSKELETAL:  0+ pitting edema lower extremities  NEUROLOGIC:  Mental Status Exam:  Level of Alertness:   awake  Orientation:   person, place, time  SKIN:  no lesions      DATA:  CBC:   Recent Labs     10/01/20  1208   WBC 12.6*   HGB 16.0   HCT 47.4        BMP:    Recent Labs     10/01/20  1208   *   K 5.3*   CL 97*   CO2 25   BUN 11   CREATININE 1.2   GLUCOSE 112*     Hepatic:   Recent Labs     10/01/20  1208   AST 18   ALT 18   BILITOT 0.8   ALKPHOS 74     Mag:    No results for input(s): MG in the last 72 hours. Phos:   No results for input(s): PHOS in the last 72 hours.    INR: No results for input(s): INR in the last 72 hours. Radiology Review: Images personally reviewed by me. CT -   1. Fluid collection in the subcutaneous soft tissues at the inferior margin    of a healing midline abdominal incision, likely abscess.  Mild overlying skin    thickening. 2. No acute findings within the abdomen or pelvis.  Previous colonic surgery    with no acute features. 3. Hepatic steatosis.  Cholelithiasis with no acute features. ASSESSMENT AND PLAN:  55 yo with lower abdominal abscess  1. Given pain and need for drainage will plan in OR  2. Depending on findings and postop symptoms may be able to discharge home. 3.  Discussed likely dressing care with packing removal  4. Plan OR today      PRACTITIONER CERTIFICATION   I certify that Jill Casey is expected to be hospitalized for >2 days based on the above assessment and plan.       Electronically signed by Tran Kerr, [address] [place] [address]  [id number]

## 2020-10-01 NOTE — ED NOTES
32 61 [id number] surgery  [phone]  [facility] AND [facility] CALLED BACK     Viktoria Finn  10/01/20 [id number]

## 2020-10-01 NOTE — ED PROVIDER NOTES
procedures. Patient at risk for serious decompensation if not treated for this life-threatening condition. MDM: Patient was evaluated due to concern for worsening lower abdominal pain with obvious abscess/induration with failed outpatient management. CT showed a large abscess and therefore general surgery was consulted. Due to concern for sepsis, he was given IV fluids along with IV antibiotics with the intent that he may need to be admitted. Surgery did see the patient and took him to the operating room to drain the abscess. Please see their note for final disposition in regards to being ultimately admitted or being discharged. He was in no acute distress when I saw him and felt comfortable with this plan.     Impression: Lower abdominal wall abscess, sepsis, failed outpatient therapy     Precious Alex MD  10/02/20 8062

## 2020-10-01 NOTE — ANESTHESIA PRE PROCEDURE
Department of Anesthesiology  Preprocedure Note       Name:  Evelina Jernigan   Age:  54 y.o.  :  1970                                          MRN:  [id number]         Date:  10/1/2020      Surgeon:  Easton Greer MD    Procedure:  ABDOMINAL WALL IRRIGATION AND DEBRIDEMENT    HPI:  54 y.o. male  who presents with lower abdominal pain. Started 4 days ago and has progressed with some increasing pain, swelling. Some chills. Started on oral abx but symptoms still worsened. CT ABD/Pelvis:  1. Fluid collection in the subcutaneous soft tissues at the inferior margin of a healing midline abdominal incision, likely abscess. Mild overlying skin thickening. 2. No acute findings within the abdomen or pelvis. Previous colonic surgery with no acute features. 3. Hepatic steatosis. Cholelithiasis with no acute features.      Medications prior to admission:    doxycycline hyclate (VIBRA-TABS) 100 MG Take 1 tablet by mouth 2 times daily Take with food   cephALEXin (KEFLEX) 500 MG capsule Take 1 capsule by mouth 3 times daily   atorvastatin (LIPITOR) 40 MG tablet TAKE 1 TABLET BY MOUTH ONE TIME A DAY    Cholecalciferol 1.25 MG (75291 UT) TABS Take 1 tablet twice weekly for vitamin D     Allergies:  No Known Allergies    Problem List:     Amblyopia of right eye H53.001    Hemorrhoids K64.9    Acute pain of right knee M25.561    Polyp of colon K63.5    Hyperplastic polyp of sigmoid colon K63.5    Abscess L02.91     Past Medical History:     Achilles tendonitis     dr Walter Coy of left hip     Childhood asthma     childhood- no adult sx    History of colon surgery spring 2019    resection for large polyp    Strain of left knee     Dr Azam Man     Past Surgical History:     ANKLE ARTHROSCOPY Left     Dr Nanette Spencer  2019    sigmoid resection- polyposis / TICs    COLONOSCOPY N/A 2018    COLONOSCOPY POLYPECTOMY SNARE/COLD BIOPSY performed by Torrey Mirza Raquel Crain MD at [address] [facility] COLONOSCOPY N/A 12/18/2018    COLONOSCOPY WITH BIOPSY performed by Colette Mcdaniel MD at [place] [address]  12/18/2018    COLONOSCOPY SUBMUCOSAL/BOTOX INJECTION performed by Colette Mcdaniel MD at [place] [address]. History:     Smoking status: Current Every Day Smoker     Packs/day: 1.00     Types: Cigarettes    Smokeless tobacco: Never Used   Substance Use Topics    Alcohol use: Yes     Alcohol/week: 0.0 standard drinks     Comment: occas. Vital Signs (Current):   Height: 5' 10\" (1.778 m)  (10/01/20)  Weight: 230 lb (104.3 kg)  (10/01/20)    BMI: 33.1            10/01/20 1316 10/01/20 1425 10/01/20 1509   BP: (!) 146/89 (!) 155/82 135/79   Pulse:  96 96   Resp:  16 16   Temp:      TempSrc:      SpO2: 98% 99% 98%                            BP Readings from Last 3 Encounters:   10/01/20 135/79   07/22/20 (!) 142/80   06/22/20 (!) 142/86     NPO Status: >8 hrs    BMI:   Wt Readings from Last 3 Encounters:   10/01/20 230 lb (104.3 kg)   07/22/20 234 lb (106.1 kg)   06/22/20 237 lb (107.5 kg)     Body mass index is 33 kg/m².     CBC:    WBC 12.6 10/01/2020    HGB 16.0 10/01/2020    HCT 47.4 10/01/2020     10/01/2020     CMP:     10/01/2020    K 5.3 10/01/2020    CL 97 10/01/2020    CO2 25 10/01/2020    BUN 11 10/01/2020    CREATININE 1.2 10/01/2020    GFRAA >60 10/01/2020    GLUCOSE 112 10/01/2020    PROT 8.1 10/01/2020    CALCIUM 9.7 10/01/2020    BILITOT 0.8 10/01/2020    ALKPHOS 74 10/01/2020    AST 18 10/01/2020    ALT 18 10/01/2020     COVID-19 Screening (If Applicable): No results found for: COVID19     Anesthesia Evaluation  Patient summary reviewed and Nursing notes reviewed no history of anesthetic complications:   Airway: Mallampati: II  TM distance: >3 FB   Neck ROM: full  Mouth opening: > = 3 FB Dental:          Pulmonary: breath sounds clear to auscultation  (+) current smoker    (-) COPD, asthma, sleep apnea and wheezes                          ROS comment: Childhood asthma- no issues as an adult   Cardiovascular:  Exercise tolerance: good (>4 METS),       (-) murmur      Rhythm: regular  Rate: normal                    Neuro/Psych:      (-) seizures, TIA and CVA           GI/Hepatic/Renal:        (-) hepatitis, liver disease and no renal disease      ROS comment: History of colon surgery (2019): resection for large polyp  . Endo/Other:        (-) diabetes mellitus, hypothyroidism               Abdominal:           Vascular:     - DVT and PE. Anesthesia Plan      general     ASA 2 - emergent       Induction: intravenous. MIPS: Prophylactic antiemetics administered. Anesthetic plan and risks discussed with patient. Plan discussed with CRNA.             Talon Lara MD

## 2020-10-01 NOTE — ED PROVIDER NOTES
[facility] Emergency Department    CHIEF COMPLAINT  Abscess (Jaun Trinity abdomen pt has abscess states since . )      SHARED SERVICE VISIT  I have seen and evaluated this patient with my supervising physician, Dr. Ludin Hinkle. HISTORY OF PRESENT ILLNESS  Caroline Gregory is a 54 y.o. male who presents to the ED complaining of a 4 day history of  \"shooting and throbbing\" 7/10 RLQ/suprapubic abdominal pain that is worse with movement and sitting upright at which time the pain is 10/10. He was prescribed antibiotics (doxycycline) by his PCP 2-3 days ago with no improvement of symptoms. He has noticed some bloody drainage. Denies urinary symptoms, fever, nausea, vomiting, chills, and sweats. He is a smoker. No other complaints, modifying factors or associated symptoms. Nursing notes reviewed.    Past Medical History:   Diagnosis Date    Achilles tendonitis     dr Violeta Wallace of left hip     Childhood asthma     childhood- no adult sx    History of colon surgery spring 2019    resection for large polyp    Strain of left knee     Dr Wai Watson     Past Surgical History:   Procedure Laterality Date    ANKLE ARTHROSCOPY Left     Dr Allen Palm  2019    sigmoid resection- polyposis / TICs    COLONOSCOPY N/A 2018    COLONOSCOPY POLYPECTOMY SNARE/COLD BIOPSY performed by Johnie Capone MD at [address] [place] [address] COLONOSCOPY N/A 2018    COLONOSCOPY WITH BIOPSY performed by Johnie Capone MD at [place] [address]  2018    COLONOSCOPY SUBMUCOSAL/BOTOX INJECTION [facility] by Johnie [facility]one MD at [facility]     Family History   Problem Relation Age of Onset    Cancer Mother         lung cancer-       Diabetes Neg Hx     Heart Disease Neg Hx      Social History     Socioeconomic History    Marital status:      Spouse name: Not on file    Number of children: Not on file    Years of education: Not on file    Highest education level: Not on file   Occupational History    Occupation: bank    Social Needs    Financial resource strain: Not on file    Food insecurity     Worry: Not on file     Inability: Not on file   [employer] needs     Medical: Not on file     Non-medical: Not on file   Tobacco Use    Smoking status: Current Every Day Smoker     Packs/day: 1.00     Types: Cigarettes    Smokeless tobacco: Never Used   Substance and Sexual Activity    Alcohol use: Yes     Alcohol/week: 0.0 standard drinks     Comment: occas.      Drug use: No    Sexual activity: Not Currently   Lifestyle    Physical activity     Days per week: Not on file     Minutes per session: Not on file    Stress: Not on file   Relationships    Social connections     Talks on phone: Not on file     Gets together: Not on file     Attends Voodoo service: Not on file     Active member of club or organization: Not on file     Attends meetings of clubs or organizations: Not on file     Relationship status: Not on file    Intimate partner violence     Fear of current or ex partner: Not on file     Emotionally abused: Not on file     Physically abused: Not on file     Forced sexual activity: Not on file   Other Topics Concern    Not on file   Social History Narrative    Not on file     Current Facility-Administered Medications   Medication Dose Route Frequency Provider Last Rate Last Dose    sodium chloride flush 0.9 % injection 10 mL  10 mL Intravenous 2 times per day Kristen Louder, APRN - CNP        sodium chloride flush 0.9 % injection 10 mL  10 mL Intravenous PRN Clyde Louder, APRN - CNP        vancomycin (VANCOCIN) 2,500 mg in dextrose 5 % 500 mL IVPB  25 mg/kg Intravenous Once Kristen Louder, APRN - .7 mL/hr at 10/01/20 1509 2,500 mg at 10/01/20 1509     Current Outpatient Medications   Medication Sig Dispense Refill    doxycycline hyclate (VIBRA-TABS) 100 MG tablet Take 1 tablet by mouth 2 times daily Take with food 30 tablet 1    cephALEXin (KEFLEX) 500 MG capsule Take 1 capsule by mouth 3 times daily 60 capsule 2    atorvastatin (LIPITOR) 40 MG tablet TAKE 1 TABLET BY MOUTH ONE TIME A DAY  30 tablet 6    Cholecalciferol 1.25 MG (13461 UT) TABS Take 1 tablet twice weekly for vitamin D 8 tablet 0     No Known Allergies    REVIEW OF SYSTEMS  10 systems reviewed, pertinent positives per HPI otherwise noted to be negative    PHYSICAL EXAM  /79   Pulse 96   Temp 99.9 °F (37.7 °C) (Oral)   Resp 16   Ht 5' 10\" (1.778 m)   Wt 230 lb (104.3 kg)   SpO2 98%   BMI 33.00 kg/m²   GENERAL APPEARANCE: Awake and alert. Cooperative.  + Distressed. Non--toxic in appearance. HEAD: Normocephalic. Atraumatic. EYES: PERRL. EOM's grossly intact. ENT: Mucous membranes are moist.   NECK: Supple. HEART: Regular rhythm with tachycardic rate @ 110 bpm. No murmurs. LUNGS: Respirations unlabored. CTAB. Good air exchange. Speaking comfortably in full sentences. ABDOMEN: Soft. Non-distended. + suprapubic tenderness. No guarding or rebound. + bowel sounds x 4 quadrants. No masses. No organomegaly. EXTREMITIES: No peripheral edema. Moves all extremities equally. All extremities neurovascularly intact. SKIN: Warm and dry. No acute rashes. + warm skin with induration overlying suprapubic skin. + small open area noted without drainage. NEUROLOGICAL: Alert and oriented. CN's 2-12 intact. No gross facial drooping. Strength 5/5, sensation intact. PSYCHIATRIC: Normal mood and affect. RADIOLOGY  Ct Abdomen Pelvis W Iv Contrast Additional Contrast? None    Result Date: 10/1/2020  EXAMINATION: CT OF THE ABDOMEN AND PELVIS WITH CONTRAST 10/1/2020 2:29 pm TECHNIQUE: CT of the abdomen and pelvis was performed with the administration of intravenous contrast. Multiplanar reformatted images are provided for review.  Dose modulation, iterative reconstruction, and/or weight based adjustment of the mA/kV was utilized to reduce the radiation dose to as low as reasonably achievable. COMPARISON: None HISTORY: ORDERING SYSTEM PROVIDED HISTORY: lower groin pain - abscess TECHNOLOGIST PROVIDED HISTORY: If patient is on cardiac monitor and/or pulse ox, they may be taken off cardiac monitor and pulse ox, left on O2 if currently on. All monitors reattached when patient returns to room. Additional Contrast?->None Reason for exam:->lower groin pain - abscess Reason for Exam: lower abd abscess since Sun, pain, draining some blood Acuity: Acute Type of Exam: Initial Relevant Medical/Surgical History: sigmoid resection FINDINGS: Lower Chest: No acute infiltrate at the lung bases. Organs: Mild hepatic steatosis with no focal abnormality. Cholelithiasis with no acute biliary findings. The spleen, pancreas and adrenal glands are unremarkable. No renal mass or significant hydronephrosis. GI/Bowel: No pericolonic inflammatory changes. Postoperative changes in the sigmoid colon. The appendix is unremarkable. No small bowel distension. The stomach and duodenal sweep are intact. Pelvis: No pelvic mass or free pelvic fluid. The prostate is not enlarged. Mild distention of the urinary bladder. Peritoneum/Retroperitoneum: The abdominal aorta is normal in caliber. No retroperitoneal adenopathy or upper abdominal ascites. Bones/Soft Tissues: Healing midline lower abdominal and pelvic incision. There is a fluid collection with marginal enhancement at the inferior margin of the incision measuring 2.7 x 6.4 x 1.9 cm, likely an abscess. Mild overlying skin thickening. Mildly prominent inguinal nodes bilaterally, likely reactive. No acute osseous findings. 1. Fluid collection in the subcutaneous soft tissues at the inferior margin of a healing midline abdominal incision, likely abscess. Mild overlying skin thickening. 2. No acute findings within the abdomen or pelvis.   Previous colonic surgery with no acute features. 3. Hepatic steatosis. Cholelithiasis with no acute features. ED COURSE  Patient received morphine for pain, with good relief. Triage vitals stable but tachycardic at 110 beats /min. Labs Ordered:  Blood culture 1: Pending  Blood culture: Pending  CRP: 76.9  ESR 46  CMP: Sodium 131, potassium 5.3, chloride 97, glucose 112: Unremarkable  Acid 1.3  CBC: Mild leukocytosis WBC 12.6, neutrophils absolute 8.7; otherwise unremarkable      Imaging ordered:  CT abdomen pelvis: Fluid collection in the subcutaneous soft tissue septae in the periosteum of a healing midline abdominal incision, likely abscess. Mild overlying skin thickening. No acute findings within the abdomen or pelvis. Previous colonic surgery with no acute features. Hepatic steatosis. This is with no acute features. Interventions:  Patient was found to meet SIRS criteria, therefore fluid resuscitation was initiated as well as antibiotic therapy with Zosyn and vancomycin. Patient was also given Tylenol for mild pyrexia. Reevaluation:  Patient reports good improvement of his symptoms as his pain is controlled. A discussion was had with Mr. Michelle Richards regarding SIRS, abscess, and intention to admit. Risk management discussed and shared decision making had with patient and/or surrogate. All questions were answered. Patient is agreeable with this plan. CRITICAL CARE TIME  0 Minutes of critical care time spent not including separately billable procedures. MDM  Patient presents to the emergency room with abscess. Alternate diagnoses are less likely based on history and physical. Considered cellulitis but less likely as no redness. Considered erysipelas but no intense redness, sharp demarcations, no central clearing and there is no high fever, chills, severe malaise, or headache.        Clinical Impression:  Abscess  SIRS        Disposition:  Admitted      Patient will be admitted to hospital for further evaluation and treatment. Hospitalist:   Consulted Dr. Gage Tan -general surgeon. Discussed patient's HPI, ED work-up, results, and treatment. Dr. Gage Tan recommends admission and states he will come to department to evaluate.     Discussed patients HPI, ED work-up, results, treatment, and response with my attending physician Dr. Kelley Gonzales and the Hospitalist -Dr. Gage Tan who agrees to admit the patient to the hospital.         Roger Bajwa, APRN - CNP  10/02/20 1234

## 2020-10-01 NOTE — OP NOTE
Date of Surgery: 10/1/20    Preop Dx:  Abdominal Wall Abscess    Postop Dx:  Same    Procedure:  INcision and Drainage of abdominal wall abscess    Surgeon:  Faheem Miller    Assistant:      Anesthesia:  general    EBL:   <50ml    Specimen:  Wound culture    Complications: none    Drains/Lines:  none    Indications:  53 yo with enlarging lower abdominal wall abscess    Description:  Patient was given adequate description of the risks and rewards of the procedure, including bleeding, infection, recurrence and freely consented. He was given appropriate antibiotics and brought to the OR where general anesthesia was induced. He was placed in supine position. Prepped and draped in usual sterile fashion. Local anesthetic instilled around abscess. Using #10 blade 2cm incision made and purulent fluid returned. Cultures done. Loculations taken down with blunt dissection. Saline irrigation sone and hemostasis achieved with cautery. There was an opening of 2c0d5cg with tunneling to the right side for 4cm at conclusion. Packed with 1 inch iodoform gauze. Gauze dressing placed. All suture, sponge and instrument count correct times two at end of case. Transferred to PACU in stable condition.     Kiesha Molina MD

## 2020-10-02 NOTE — ANESTHESIA POSTPROCEDURE EVALUATION
Department of Anesthesiology  Postprocedure Note    Patient: Jorge Win  MRN: [id number]  YOB: 1970  Date of evaluation: 10/1/2020    Procedure Summary     Date:  10/01/20 Room / Location:  [address]    Anesthesia Start:  4361 Anesthesia Stop:  5226    Procedure:  ABDOMINAL WALL IRRIGATION AND DEBRIDEMENT (N/A Abdomen) Diagnosis:  (ABDOMINAL WALL INFECTION)    Surgeon:  Cyndi Tom MD Responsible Provider:  Francisco Cardenas MD    Anesthesia Type:  general ASA Status:  2 - Emergent        Anesthesia Type: No value filed. Ryan Phase I: Ryan Score: 8    Ryan Phase II: Ryan Score: 10    Last vitals: Reviewed and per EMR flowsheets.      Anesthesia Post Evaluation   Anesthetic Problems: no   Cardiovascular System Stable: yes  Respiratory Function: Airway Patent yes  ETT no  Ventilator no  Level of consciousness: awake, alert and oriented  Post-op pain: adequate analgesia  Hydration Adequate: yes  Nausea/Vomiting:no  Other Issues:     Maryanna Alpers, MD

## 2020-10-05 LAB — BLOOD CULTURE, ROUTINE: NORMAL

## 2020-10-06 LAB
ANAEROBIC CULTURE: ABNORMAL
CULTURE SURGICAL: ABNORMAL
GRAM STAIN RESULT: ABNORMAL
ORGANISM: ABNORMAL
ORGANISM: ABNORMAL

## 2020-10-14 RX ORDER — DOXYCYCLINE HYCLATE 100 MG
100 TABLET ORAL 2 TIMES DAILY
Qty: 30 TABLET | Refills: 1 | Status: SHIPPED | OUTPATIENT
Start: 2020-10-14 | End: 2021-06-21 | Stop reason: SDUPTHER

## 2020-10-22 ENCOUNTER — TELEPHONE (OUTPATIENT)
Dept: FAMILY MEDICINE CLINIC | Age: 50
End: 2020-10-22

## 2020-10-22 NOTE — TELEPHONE ENCOUNTER
Patient is wanting to know if you are calling in a different antibiotic or if he is to continue taking doxycycline hyclate (VIBRA-TABS) 100 MG tablet Raciel Luo,  Based on Culture that is in his mychart please advise

## 2020-10-23 RX ORDER — CEFUROXIME AXETIL 250 MG/1
250 TABLET ORAL 2 TIMES DAILY
Qty: 30 TABLET | Refills: 1 | Status: SHIPPED | OUTPATIENT
Start: 2020-10-23 | End: 2021-11-17 | Stop reason: ALTCHOICE

## 2020-11-02 LAB
FUNGUS (MYCOLOGY) CULTURE: NORMAL
FUNGUS STAIN: NORMAL

## 2020-11-17 LAB
AFB CULTURE (MYCOBACTERIA): NORMAL
AFB SMEAR: NORMAL

## 2021-06-21 ENCOUNTER — TELEPHONE (OUTPATIENT)
Dept: FAMILY MEDICINE CLINIC | Age: 51
End: 2021-06-21

## 2021-06-21 RX ORDER — DOXYCYCLINE HYCLATE 100 MG
100 TABLET ORAL 2 TIMES DAILY
Qty: 30 TABLET | Refills: 1 | Status: SHIPPED | OUTPATIENT
Start: 2021-06-21 | End: 2021-11-17 | Stop reason: ALTCHOICE

## 2021-06-21 NOTE — TELEPHONE ENCOUNTER
Pt has boil under right arm. Started early last week and popped over the weekend. Pt is wanting to get an antibiotic. Please advise.

## 2021-11-17 ENCOUNTER — OFFICE VISIT (OUTPATIENT)
Dept: FAMILY MEDICINE CLINIC | Age: 51
End: 2021-11-17
Payer: COMMERCIAL

## 2021-11-17 VITALS
WEIGHT: 251 LBS | HEART RATE: 76 BPM | HEIGHT: 70 IN | BODY MASS INDEX: 35.93 KG/M2 | DIASTOLIC BLOOD PRESSURE: 80 MMHG | TEMPERATURE: 97.6 F | OXYGEN SATURATION: 96 % | RESPIRATION RATE: 14 BRPM | SYSTOLIC BLOOD PRESSURE: 126 MMHG

## 2021-11-17 DIAGNOSIS — Z00.00 ANNUAL PHYSICAL EXAM: ICD-10-CM

## 2021-11-17 DIAGNOSIS — G47.39 SLEEP APNEA-LIKE BEHAVIOR: ICD-10-CM

## 2021-11-17 DIAGNOSIS — Z00.00 ANNUAL PHYSICAL EXAM: Primary | ICD-10-CM

## 2021-11-17 DIAGNOSIS — R53.82 CHRONIC FATIGUE: ICD-10-CM

## 2021-11-17 LAB
A/G RATIO: 1.6 (ref 1.1–2.2)
ALBUMIN SERPL-MCNC: 4.6 G/DL (ref 3.4–5)
ALP BLD-CCNC: 58 U/L (ref 40–129)
ALT SERPL-CCNC: 26 U/L (ref 10–40)
ANION GAP SERPL CALCULATED.3IONS-SCNC: 17 MMOL/L (ref 3–16)
AST SERPL-CCNC: 21 U/L (ref 15–37)
BASOPHILS ABSOLUTE: 0.1 K/UL (ref 0–0.2)
BASOPHILS RELATIVE PERCENT: 0.7 %
BILIRUB SERPL-MCNC: 0.4 MG/DL (ref 0–1)
BUN BLDV-MCNC: 7 MG/DL (ref 7–20)
CALCIUM SERPL-MCNC: 9.7 MG/DL (ref 8.3–10.6)
CHLORIDE BLD-SCNC: 99 MMOL/L (ref 99–110)
CHOLESTEROL, TOTAL: 298 MG/DL (ref 0–199)
CO2: 22 MMOL/L (ref 21–32)
CREAT SERPL-MCNC: 1 MG/DL (ref 0.9–1.3)
EOSINOPHILS ABSOLUTE: 0.3 K/UL (ref 0–0.6)
EOSINOPHILS RELATIVE PERCENT: 3.8 %
GFR AFRICAN AMERICAN: >60
GFR NON-AFRICAN AMERICAN: >60
GLUCOSE BLD-MCNC: 130 MG/DL (ref 70–99)
HCT VFR BLD CALC: 48.1 % (ref 40.5–52.5)
HDLC SERPL-MCNC: 29 MG/DL (ref 40–60)
HEMOGLOBIN: 16.1 G/DL (ref 13.5–17.5)
LDL CHOLESTEROL CALCULATED: ABNORMAL MG/DL
LDL CHOLESTEROL DIRECT: 186 MG/DL
LYMPHOCYTES ABSOLUTE: 3.7 K/UL (ref 1–5.1)
LYMPHOCYTES RELATIVE PERCENT: 47.8 %
MCH RBC QN AUTO: 32.5 PG (ref 26–34)
MCHC RBC AUTO-ENTMCNC: 33.4 G/DL (ref 31–36)
MCV RBC AUTO: 97.2 FL (ref 80–100)
MONOCYTES ABSOLUTE: 0.5 K/UL (ref 0–1.3)
MONOCYTES RELATIVE PERCENT: 6.4 %
NEUTROPHILS ABSOLUTE: 3.2 K/UL (ref 1.7–7.7)
NEUTROPHILS RELATIVE PERCENT: 41.3 %
PDW BLD-RTO: 14.5 % (ref 12.4–15.4)
PLATELET # BLD: 264 K/UL (ref 135–450)
PMV BLD AUTO: 9.2 FL (ref 5–10.5)
POTASSIUM SERPL-SCNC: 4.8 MMOL/L (ref 3.5–5.1)
RBC # BLD: 4.95 M/UL (ref 4.2–5.9)
SODIUM BLD-SCNC: 138 MMOL/L (ref 136–145)
TOTAL PROTEIN: 7.5 G/DL (ref 6.4–8.2)
TRIGL SERPL-MCNC: 552 MG/DL (ref 0–150)
TSH REFLEX: 2.59 UIU/ML (ref 0.27–4.2)
VLDLC SERPL CALC-MCNC: ABNORMAL MG/DL
WBC # BLD: 7.7 K/UL (ref 4–11)

## 2021-11-17 PROCEDURE — 99396 PREV VISIT EST AGE 40-64: CPT | Performed by: FAMILY MEDICINE

## 2021-11-17 ASSESSMENT — ENCOUNTER SYMPTOMS
GASTROINTESTINAL NEGATIVE: 1
TROUBLE SWALLOWING: 0

## 2021-11-17 ASSESSMENT — PATIENT HEALTH QUESTIONNAIRE - PHQ9
SUM OF ALL RESPONSES TO PHQ QUESTIONS 1-9: 0
2. FEELING DOWN, DEPRESSED OR HOPELESS: 0
SUM OF ALL RESPONSES TO PHQ QUESTIONS 1-9: 0
SUM OF ALL RESPONSES TO PHQ9 QUESTIONS 1 & 2: 0
1. LITTLE INTEREST OR PLEASURE IN DOING THINGS: 0
SUM OF ALL RESPONSES TO PHQ QUESTIONS 1-9: 0

## 2021-11-17 NOTE — PROGRESS NOTES
Subjective:      Patient ID: Hina Christensen is a 52 y.o. y.o. male. Here for annual physical  Has been generally well. Not taking any lipid meds currently. General history and medications reviewed. Smoking 1 ppd. Discussed cutting back and look to stopping,  Strategies discussed. HPI      Chief Complaint   Patient presents with    Annual Exam       No Known Allergies    Past Medical History:   Diagnosis Date    Achilles tendonitis 2011    dr Perez Flow of left hip 2011    Childhood asthma     childhood- no adult sx    History of colon surgery spring 2019    resection for large polyp    Strain of left knee     Dr Unique Ga       Past Surgical History:   Procedure Laterality Date    ABDOMEN SURGERY N/A 10/1/2020    ABDOMINAL WALL IRRIGATION AND DEBRIDEMENT performed by Dago Sullvian MD at [place] [address] ARTHROSCOPY Left 2012    Dr Lis Schrader  03/2019    sigmoid resection- polyposis / TICs    COLONOSCOPY N/A 12/18/2018    COLONOSCOPY POLYPECTOMY SNARE/COLD BIOPSY performed by Clara Salgado MD at [facility] COLONOSCOPY N/A 12/18/2018    COLONOSCOPY WITH BIOPSY performed by Clara Salgado MD at [address] [facility]  12/18/2018    COLONOSCOPY SUBMUCOSAL/BOTOX INJECTION performed by Clara Salgado MD at [address] [facility]  [address] History     Socioeconomic History    Marital status:      Spouse name: Not on file    Number of children: Not on file    Years of education: Not on file    Highest education level: Not on file   Occupational History    Occupation: bank    Tobacco Use    Smoking status: Current Every Day Smoker     Packs/day: 1.00     Types: Cigarettes    Smokeless tobacco: Never Used   Vaping Use    Vaping Use: Never used   Substance and Sexual Activity    Alcohol use: Yes     Alcohol/week: 0.0 standard drinks     Comment: occas.      Drug use: No    Sexual activity: Not Currently   Other Topics Concern    Not on file   Social History Narrative    Not on file     Social Determinants of Health     Financial Resource Strain:     Difficulty of Paying Living Expenses: Not on file   Food Insecurity:     Worried About Running Out of Food in the Last Year: Not on file    Gayle of Food in the Last Year: Not on file   Transportation Needs:     Lack of Transportation (Medical): Not on file    Lack of Transportation (Non-Medical): Not on file   Physical Activity:     Days of Exercise per Week: Not on file    Minutes of Exercise per Session: Not on file   Stress:     Feeling of Stress : Not on file   Social Connections:     Frequency of Communication with Friends and Family: Not on file    Frequency of Social Gatherings with Friends and Family: Not on file    Attends Mormon Services: Not on file    Active Member of [address] [place] [address] [employer] or Organizations: Not on file    Attends Club or Organization Meetings: Not on file    Marital Status: Not on file   Intimate Partner Violence:     Fear of Current or Ex-Partner: Not on file    Emotionally Abused: Not on file    Physically Abused: Not on file    Sexually Abused: Not on file   Housing Stability:     Unable to Pay for Housing in the Last Year: Not on file    Number of [place] in the Last Year: Not on file    Unstable Housing in the Last Year: Not on file       Family History   Problem Relation Age of Onset    Cancer Mother         lung cancer-   2019    Diabetes Neg Hx     Heart Disease Neg Hx        Vitals:    21 0907   BP: 126/80   Pulse: 76   Resp: 14   Temp: 97.6 °F (36.4 °C)   SpO2: 96%       Wt Readings from Last 3 Encounters:   21 251 lb (113.9 kg)   10/01/20 230 lb (104.3 kg)   20 234 lb (106.1 kg)       Review of Systems   Constitutional: Positive for unexpected weight change. Has gained some weight. Has not been exercising or walking.   Reduction in change in diet portions discussed. HENT: Negative for sore throat and trouble swallowing. Eyes: Negative for visual disturbance. Respiratory:        + smoker     Snores,  Not well rested in the morning. Wife has observed pauses in resp. Some sluggish feeling daytime. Cardiovascular: Negative. Gastrointestinal: Negative. Genitourinary: Negative. Musculoskeletal: Negative. Skin:        Has some boils recur but seem smaller and less severe. Allergic/Immunologic: Negative for environmental allergies. Neurological: Negative. Negative for seizures, facial asymmetry and numbness. Psychiatric/Behavioral: Negative. Objective:   Physical Exam  Vitals reviewed. Constitutional:       Appearance: He is not ill-appearing. Eyes:      General: No scleral icterus. Conjunctiva/sclera: Conjunctivae normal.      Comments: Left eye lateral amblyopia   Neck:      Vascular: No carotid bruit. Comments: Neck 18-1/2  Cardiovascular:      Rate and Rhythm: Normal rate and regular rhythm. Pulses: Normal pulses. Heart sounds: Normal heart sounds. Pulmonary:      Effort: Pulmonary effort is normal.      Breath sounds: Normal breath sounds. Abdominal:      General: Abdomen is flat. Bowel sounds are normal.      Palpations: Abdomen is soft. There is no mass. Tenderness: There is no abdominal tenderness. Comments: overweight   Musculoskeletal:         General: No swelling. Right lower leg: No edema. Left lower leg: No edema. Lymphadenopathy:      Cervical: No cervical adenopathy. Skin:     General: Skin is warm and dry. Neurological:      Mental Status: He is alert and oriented to person, place, and time. Psychiatric:         Mood and Affect: Mood normal.         Behavior: Behavior normal.         Thought Content:  Thought content normal.         Judgment: Judgment normal.         Assessment:      Annual physical  History of elevated cholesterol  Tobacco use  Probable sleep apnea   Plan:     Labs today. Discussed general health issues and maintenance. Sleep eval   Discussed JULIAN and medical issues. Will refer for evaluation. General inst          Current Outpatient Medications   Medication Sig Dispense Refill    atorvastatin (LIPITOR) 40 MG tablet TAKE 1 TABLET BY MOUTH ONE TIME A DAY  (Patient not taking: Reported on 11/17/2021) 30 tablet 6     No current facility-administered medications for this visit.

## 2021-11-17 NOTE — PATIENT INSTRUCTIONS
Call me for the blood results if we have not called you    Call the sleep center for evaluation    Try to cut back on smoking

## 2021-11-21 ASSESSMENT — ENCOUNTER SYMPTOMS: SORE THROAT: 0

## 2022-02-01 ENCOUNTER — VIRTUAL VISIT (OUTPATIENT)
Dept: SLEEP MEDICINE | Age: 52
End: 2022-02-01
Payer: COMMERCIAL

## 2022-02-01 ENCOUNTER — TELEPHONE (OUTPATIENT)
Dept: SLEEP MEDICINE | Age: 52
End: 2022-02-01

## 2022-02-01 DIAGNOSIS — R06.83 SNORING: Primary | ICD-10-CM

## 2022-02-01 DIAGNOSIS — E66.9 OBESITY (BMI 30-39.9): ICD-10-CM

## 2022-02-01 DIAGNOSIS — R53.83 OTHER FATIGUE: ICD-10-CM

## 2022-02-01 DIAGNOSIS — G47.30 OBSERVED SLEEP APNEA: ICD-10-CM

## 2022-02-01 PROCEDURE — 99204 OFFICE O/P NEW MOD 45 MIN: CPT | Performed by: NURSE PRACTITIONER

## 2022-02-01 ASSESSMENT — SLEEP AND FATIGUE QUESTIONNAIRES
HOW LIKELY ARE YOU TO NOD OFF OR FALL ASLEEP WHILE SITTING INACTIVE IN A PUBLIC PLACE: 0
HOW LIKELY ARE YOU TO NOD OFF OR FALL ASLEEP WHEN YOU ARE A PASSENGER IN A CAR FOR AN HOUR WITHOUT A BREAK: 0
HOW LIKELY ARE YOU TO NOD OFF OR FALL ASLEEP WHILE WATCHING TV: 2
HOW LIKELY ARE YOU TO NOD OFF OR FALL ASLEEP WHILE SITTING QUIETLY AFTER LUNCH WITHOUT ALCOHOL: 1
HOW LIKELY ARE YOU TO NOD OFF OR FALL ASLEEP WHILE LYING DOWN TO REST IN THE AFTERNOON WHEN CIRCUMSTANCES PERMIT: 3
HOW LIKELY ARE YOU TO NOD OFF OR FALL ASLEEP IN A CAR, WHILE STOPPED FOR A FEW MINUTES IN TRAFFIC: 0
HOW LIKELY ARE YOU TO NOD OFF OR FALL ASLEEP WHILE SITTING AND READING: 3
HOW LIKELY ARE YOU TO NOD OFF OR FALL ASLEEP WHILE SITTING AND TALKING TO SOMEONE: 0
ESS TOTAL SCORE: 9

## 2022-02-01 NOTE — PATIENT INSTRUCTIONS
Sleep Hygiene. .. Tips for better sleep. .. Avoid naps. This will ensure you are sleepy at bedtime. If you have to take a nap, sleep less than 1 hour, before 3 pm.  Sleep only when sleepy; this reduces the time you are awake in bed. Regular exercise is recommended to help you deepen your sleep, but not within 4-6 hours of your bedtime. Timing of exercise is important, aim to exercise early in the morning or early afternoon. A light snack may help you fall asleep. Warm milk and foods high in the amino acid tryptophan, such as bananas, may help you to sleep  Be sure to avoid heavy, spicy or sugary foods 4-6 hours before bedtime and avoid at snack time. Stay away from stimulants such as caffeine and nicotine for at least 4-6 hours before bed. Stimulants can interfere with your ability to fall asleep. Caffeine is found in tea, cola, coffee, cocoa and chocolate and is best avoided at bedtime. Nicotine is found in tobacco products. Avoid alcohol 4-6 hours before bedtime. Alcohol has an immediate sleep-inducing effect, after a few hours when alcohol levels fall there is a stimulant or wake-up effect and will cause fragmented sleep. Sleep rituals are important. Give your body clues it is time to slow down and sleep. Examples include; yoga, deep breathing, listen to relaxing music, a hot bath or a few minutes of reading. Have a fixed bedtime and awakening time, Even on weekends! You will feel better keeping a regular sleep cycle, even if you are retired or not working. Get into your favorite sleep position. If not asleep in 30 minutes, get up and do something boring until you feel sleepy. Remember not to expose yourself to bright lights such as TV, phone or tablet screens. Only use your bed for sleeping. Do not use your bed as an office, workroom or recreation room. Use comfortable bedding. Uncomfortable bedding can prevent good sleep. Ensure your bedroom is quiet and comfortable.  A cooler room along with enough blankets to stay warm is recommended. If your room is too noisy, try a white noise machine. If too bright, try black out shades or an eye mask. Dont take worries to bed. Leave worries about work, school etc. behind you when you go to bed. Some people find it helpful to assign a worry period in the evening or late afternoon to write down your worries and get them out of your system. Address to Sleep Center: [facility] [address] [place] [address], [address] [place] [address]            Phone: [phone] Fax: [phone]    If you should need to cancel or reschedule your appointment, please call the Sleep Center at [phone] as soon as possible. We ask that you please phone the [facility] Patient Pre-Services ([phone]) at least 3-5 days prior to your sleep study to pre-register. Failing to pre-register may ultimately cause your insurance to not pay for this procedure. Please refrain from or reduce the use of caffeine and/or alcohol prior to your sleep study and avoid napping the day of your study as these will affect the accuracy of your test results.

## 2022-02-01 NOTE — TELEPHONE ENCOUNTER
Within this Telehealth Consent, the terms you and yours refer to the person using the Telehealth Service (Service), or in the case of a use of the Service by or on behalf of a minor, you and yours refer to and include (i) the parent or legal guardian who provides consent to the use of the Service by such minor or uses the Service on behalf of such minor, and (ii) the minor for whom consent is being provided or on whose behalf the Service is being utilized. When using Service, you will be consulting with your health care providers via the use of Telehealth.   Telehealth involves the delivery of healthcare services using electronic communications, information technology or other means between a healthcare provider and a patient who are not in the same physical location. Telehealth may be used for diagnosis, treatment, follow-up and/or patient education, and may include, but is not limited to, one or more of the following:    Electronic transmission of medical records, photo images, personal health information or other data between a patient and a healthcare provider    Interactions between a patient and healthcare provider via audio, video and/or data communications    Use of output data from medical devices, sound and video files    Anticipated Benefits   The use of Telehealth by your Provider(s) through the Service may have the following possible benefits:    Making it easier and more efficient for you to access medical care and treatment for the conditions treated by such Provider(s) utilizing the Service    Allowing you to obtain medical care and treatment by Provider(s) at times that are convenient for you    Enabling you to interact with Provider(s) without the necessity of an in-office appointment     Possible Risks   While the use of Telehealth can provide potential benefits for you, there are also potential risks associated with the use of Telehealth.  These risks include, but may not be limited to the following:    Your Provider(s) may not able to provide medical treatment for your particular condition and you may be required to seek alternative healthcare or emergency care services.  The electronic systems or other security protocols or safeguards used in the Service could fail, causing a breach of privacy of your medical or other information.  Given regulatory requirements in certain jurisdictions, your Provider(s) diagnosis and/or treatment options, especially pertaining to certain prescriptions, may be limited. Acceptance   1. You understand that Services will be provided via Telehealth. This process involves the use of HIPAA compliant and secure, real-time audio-visual interfacing with a qualified and appropriately trained provider located at [facility]. 2. You understand that, under no circumstances, will this session be recorded. 3. You understand that the Provider(s) at [facility] and other clinical participants will be party to the information obtained during the Telehealth session in accordance with best medical practices. 4. You understand that the information obtained during the Telehealth session will be used to help determine the most appropriate treatment options. 5. You understand that You have the right to revoke this consent at any point in time. 6. You understand that Telehealth is voluntary, and that continued treatment is not dependent upon consent. 7. You understand that, in the event of non-consent to Telehealth services and/or technical difficulties, you will obtain services as typically provided in the absence of Telehealth technology. 8. You understand that this consent will be kept in Your medical record. 9. No potential benefits from the use of Telehealth or specific results can be guaranteed. Your condition may not be cured or improved and, in some cases, may get worse.    10. There are limitations in the provision of medical care and treatment via Telehealth and the Service and you may not be able to receive diagnosis and/or treatment through the Service for every condition for which you seek diagnosis and/or treatment. 11. There are potential risks to the use of Telehealth, including but not limited to the risks described in this Telehealth Consent. 12. Your Provider(s) have discussed the use of Telehealth and the Service with you, including the benefits and risks of such and you have provided oral consent to your Provider(s) for the use of Telehealth and the Service. 15. You understand that it is your duty to provide your Provider(s) truthful, accurate and complete information, including all relevant information regarding care that you may have received or may be receiving from other healthcare providers outside of the Service. 14. You understand that each of your Provider(s) may determine in his or sole discretion that your condition is not suitable for diagnosis and/or treatment using the Service, and that you may need to seek medical care and treatment a specialist or other healthcare provider, outside of the Service. 15. You understand that you are fully responsible for payment for all services provided by Provider(s) or through use of the Service and that you may not be able to use third-party insurance. 16. You represent that (a) you have read this Telehealth Consent carefully, (b) you understand the risks and benefits of the Service and the use of Telehealth in the medical care and treatment provided to you by Provider(s) using the Service, and (c) you have the legal capacity and authority to provide this consent for yourself and/or the minor for which you are consenting under applicable federal and state laws, including laws relating to the age of [de-identified] and/or parental/guardian consent.    17. You give your informed consent to the use of Telehealth by Provider(s) using the Service under the terms described in the Terms of Service and this Telehealth Consent. The patient was read the following statement and has consented to the visit as of 2/1/22. The patient has been scheduled for their first telehealth visit on 2.1.22 with Dex Aviles.

## 2022-02-01 NOTE — LETTER
[facility]  [address]  [address]  Phone: [phone]  Fax: [phone]           SHAAN Christianson - MEHRDAD      February 1, 2022     Patient: Timothy Squires   MR Number: [id number]   YOB: 1970   Date of Visit: 2/1/2022       Dear Dr. Mcgee Loud:    Thank you for referring Prudence Cough to me for evaluation/treatment. Below are the relevant portions of my assessment and plan of care. Assessment:       · Snoring  · Observed sleep apnea   · Fatigue  · Obesity        Plan:      · HST to evaluate forsleep related breathing disorder. · Treatment options were discussed with patient if HST reveals JULIAN, including CPAP/APAP therapy, oral appliances and upper airway surgery. · Trial auto CPAP 8-16 cm H2O if HST is positive for JULIAN  · Sleep hygiene  · Avoidsedatives, alcohol and caffeinated drinks at bed time. · No driving motorized vehicles or operating heavy machinery while fatigue, drowsy or sleepy. · Weight loss is also recommended as a long-term intervention. · Complications of JULIAN if not treated were discussed with patient patient to include systemic hypertension, pulmonary hypertension, cardiovascular morbidities, car accidents and all cause mortality. Discussed pathophysiology of JULIAN with patient today  Patient education handout provided regarding sleep tips      If you have questions, please do not hesitate to call me. I look forward to following Denise Petersen along with you.     Sincerely,        SHAAN Christianson CNP    CC providers:    No Recipients

## 2022-02-01 NOTE — PROGRESS NOTES
Patient ID: Burgess Brito is a 54 y.o. male who is being seen today for   Chief Complaint   Patient presents with    New Patient     referred Dr Flaco bashir no previous studues     Referring: Dr. Alphonse Mann    HPI:   Burgess Brito is a 54 y.o. male for televideo appointment via video and audio doxy. me virtual visit for fatigue evaluation. Patient reports snoring at night for the past 20years. Worse in supine position. Wakes self snoring. Has witnessed apnea. Sometimes restorative sleep. Sometimes dry mouth upon awakening. Fatigue and tiredness during the day. No history of sleep apnea. Bedtime MN and rise time is 7 am. It takes few minutes to fall asleep. 1 nocturia. Wakes up 1 times at night. It takes few minutes to fall back a sleep. Takes 1 nap during the day (  minutes). No headache in am. No car wrecks or near wrecks because of the sleepiness. No nodding off while driving. Gained 20 pounds in the past 2 years. No forgetfulness or decreased concentration. Drinks 2-3 caffinated beverages per day. Occasional alcohol. No restless feelings in legs at night.  +teeth grinding. No nightmares. No sleep walking. No night time panic attacks. No narcotics. No drug abuse. No history of depression. No history of anxiety. No history of atrial fibrillation. No history of DM. No history of HTN. No history of ischemic heart disease. No history of stroke. ESS is 9. +smoking- 1 pack a day. No known FH for JULIAN, RLS or narcolepsy.      Sleep Medicine 2/1/2022   Sitting and reading 3   Watching TV 2   Sitting, inactive in a public place (e.g. a theatre or a meeting) 0   As a passenger in a car for an hour without a break 0   Lying down to rest in the afternoon when circumstances permit 3   Sitting and talking to someone 0   Sitting quietly after a lunch without alcohol 1   In a car, while stopped for a few minutes in traffic 0   Total score 9       Past Medical History:  Past Medical History:   Diagnosis Date  Achilles tendonitis     dr Tavares Curling of left hip     Childhood asthma     childhood- no adult sx    History of colon surgery spring 2019    resection for large polyp    Strain of left knee     Dr Chanell Ryder       Past Surgical History:        Procedure Laterality Date    ABDOMEN SURGERY N/A 10/1/2020    ABDOMINAL WALL IRRIGATION AND DEBRIDEMENT performed by Max Gilmore MD at [facility] [address] ARTHROSCOPY Left     Dr Jose Anderson  2019    sigmoid resection- polyposis / TICs    COLONOSCOPY N/A 2018    COLONOSCOPY POLYPECTOMY SNARE/COLD BIOPSY performed by Lina Gar MD at [address] COLONOSCOPY N/A 2018    COLONOSCOPY WITH BIOPSY performed by Lina Gar MD at [facility]  2018    COLONOSCOPY SUBMUCOSAL/BOTOX INJECTION performed by Lina Gar MD at [facility] 53.:  has No Known Allergies. Social History:    TOBACCO:   reports that he has been smoking cigarettes. He has a 15.00 pack-year smoking history. He has never used smokeless tobacco.  ETOH:   reports current alcohol use. Family History:       Problem Relation Age of Onset    Cancer Mother         lung cancer-       Diabetes Neg Hx     Heart Disease Neg Hx        Current Medications:  No current outpatient medications on file.       REVIEW OF SYSTEMS:  Review of Systems  Constitutional: Negative for fever  HENT: Negative for sore throat  Eyes: Negative for redness   Respiratory: Negative for dyspnea, cough  Cardiovascular: Negative for chest pain  Gastrointestinal: Negative for vomiting, diarrhea   Genitourinary: Negative for hematuria   Musculoskeletal: Negative for arthralgias   Skin: Negative for rash  Neurological: Negative for syncope  Hematological: Negative for adenopathy  Psychiatric/Behavorial: Negative for anxiety      Objective:   PHYSICAL EXAM:  There were no vitals taken for this visit. Physical Exam  Exam:  Gen: No acute distress, does not appear to be in pain. Appears well developed and nourished. HENT: Head is normocephalic and atraumatic. Normal appearing nose. External Ears normal.   Neck: No visualized mass. Trachea is midline   Eyes: EOM intact. No visible discharge. Resp:No visualized signs of difficulty breathing or respiratory distress, speaking in full sentences. Respiratory effort normal.  Neuro: Awake. Alert. Able to follow commands. No facial asymmetry. Skin: No significant lesions or discoloration noted on facial skin    Musculoskeletal: Normal range of motion of the neck. Psych: Oriented x 3. No anxiety. Normal affect. DATA:       Assessment:       · Snoring  · Observed sleep apnea   · Fatigue  · Obesity        Plan:      · HST to evaluate forsleep related breathing disorder. · Treatment options were discussed with patient if HST reveals JULIAN, including CPAP/APAP therapy, oral appliances and upper airway surgery. · Trial auto CPAP 8-16 cm H2O if HST is positive for JULIAN  · Sleep hygiene  · Avoidsedatives, alcohol and caffeinated drinks at bed time. · No driving motorized vehicles or operating heavy machinery while fatigue, drowsy or sleepy. · Weight loss is also recommended as a long-term intervention. · Complications of JULIAN if not treated were discussed with patient patient to include systemic hypertension, pulmonary hypertension, cardiovascular morbidities, car accidents and all cause mortality. Discussed pathophysiology of JULIAN with patient today  Patient education handout provided regarding sleep tips     Kvng Frederick was evaluated through a synchronous (real-time) audio-video encounter. The patient (or guardian if applicable) is aware that this is a billable service, which includes applicable co-pays. This Virtual Visit was conducted with patient's (and/or legal guardian's) consent.  The visit was conducted pursuant to the emergency declaration under the [facility], [address] [place] [address] authority and the [employer] Act. Patient identification was verified, and a caregiver was present when appropriate. The patient was located at home in a state where the provider was licensed to provide care. Total time spent for this encounter: Not billed by time    --SHAAN Hutchinson CNP on 2/1/2022 at 11:18 AM    An electronic signature was used to authenticate this note.

## 2022-02-22 ENCOUNTER — HOSPITAL ENCOUNTER (OUTPATIENT)
Dept: SLEEP CENTER | Age: 52
Discharge: HOME | End: 2022-02-24
Payer: COMMERCIAL

## 2022-02-22 DIAGNOSIS — G47.30 OBSERVED SLEEP APNEA: ICD-10-CM

## 2022-02-22 DIAGNOSIS — E66.9 OBESITY (BMI 30-39.9): ICD-10-CM

## 2022-02-22 DIAGNOSIS — R53.83 OTHER FATIGUE: ICD-10-CM

## 2022-02-22 DIAGNOSIS — R06.83 SNORING: ICD-10-CM

## 2022-02-22 PROCEDURE — 95806 SLEEP STUDY UNATT&RESP EFFT: CPT

## 2022-03-01 ENCOUNTER — TELEPHONE (OUTPATIENT)
Dept: PULMONOLOGY | Age: 52
End: 2022-03-01

## 2022-03-01 DIAGNOSIS — G47.33 OSA (OBSTRUCTIVE SLEEP APNEA): Primary | ICD-10-CM

## 2022-03-01 NOTE — TELEPHONE ENCOUNTER
PAP orders. Need faxed, with testing/OV note/demographics/insurance, once signed, to [employer].

## 2022-04-26 ENCOUNTER — OFFICE VISIT (OUTPATIENT)
Dept: FAMILY MEDICINE CLINIC | Age: 52
End: 2022-04-26
Payer: COMMERCIAL

## 2022-04-26 VITALS
SYSTOLIC BLOOD PRESSURE: 126 MMHG | WEIGHT: 211 LBS | TEMPERATURE: 96.9 F | RESPIRATION RATE: 16 BRPM | HEART RATE: 112 BPM | BODY MASS INDEX: 30.28 KG/M2 | DIASTOLIC BLOOD PRESSURE: 84 MMHG

## 2022-04-26 DIAGNOSIS — L02.91 ABSCESS: Primary | ICD-10-CM

## 2022-04-26 PROCEDURE — 10061 I&D ABSCESS COMP/MULTIPLE: CPT | Performed by: FAMILY MEDICINE

## 2022-04-26 PROCEDURE — 99213 OFFICE O/P EST LOW 20 MIN: CPT | Performed by: FAMILY MEDICINE

## 2022-04-26 RX ORDER — DOXYCYCLINE HYCLATE 100 MG
100 TABLET ORAL 2 TIMES DAILY
Qty: 20 TABLET | Refills: 0 | Status: SHIPPED | OUTPATIENT
Start: 2022-04-26 | End: 2022-06-03 | Stop reason: SDUPTHER

## 2022-04-26 SDOH — ECONOMIC STABILITY: HOUSING INSECURITY
IN THE LAST 12 MONTHS, WAS THERE A TIME WHEN YOU DID NOT HAVE A STEADY PLACE TO SLEEP OR SLEPT IN A SHELTER (INCLUDING NOW)?: NO

## 2022-04-26 SDOH — ECONOMIC STABILITY: FOOD INSECURITY: WITHIN THE PAST 12 MONTHS, THE FOOD YOU BOUGHT JUST DIDN'T LAST AND YOU DIDN'T HAVE MONEY TO GET MORE.: NEVER TRUE

## 2022-04-26 SDOH — ECONOMIC STABILITY: FOOD INSECURITY: WITHIN THE PAST 12 MONTHS, YOU WORRIED THAT YOUR FOOD WOULD RUN OUT BEFORE YOU GOT MONEY TO BUY MORE.: NEVER TRUE

## 2022-04-26 SDOH — ECONOMIC STABILITY: TRANSPORTATION INSECURITY
IN THE PAST 12 MONTHS, HAS THE LACK OF TRANSPORTATION KEPT YOU FROM MEDICAL APPOINTMENTS OR FROM GETTING MEDICATIONS?: NO

## 2022-04-26 SDOH — ECONOMIC STABILITY: TRANSPORTATION INSECURITY
IN THE PAST 12 MONTHS, HAS LACK OF TRANSPORTATION KEPT YOU FROM MEETINGS, WORK, OR FROM GETTING THINGS NEEDED FOR DAILY LIVING?: NO

## 2022-04-26 SDOH — ECONOMIC STABILITY: INCOME INSECURITY: IN THE LAST 12 MONTHS, WAS THERE A TIME WHEN YOU WERE NOT ABLE TO PAY THE MORTGAGE OR RENT ON TIME?: NO

## 2022-04-26 ASSESSMENT — PATIENT HEALTH QUESTIONNAIRE - PHQ9
SUM OF ALL RESPONSES TO PHQ QUESTIONS 1-9: 0
SUM OF ALL RESPONSES TO PHQ9 QUESTIONS 1 & 2: 0
2. FEELING DOWN, DEPRESSED OR HOPELESS: 0
SUM OF ALL RESPONSES TO PHQ QUESTIONS 1-9: 0
1. LITTLE INTEREST OR PLEASURE IN DOING THINGS: 0

## 2022-04-26 ASSESSMENT — ENCOUNTER SYMPTOMS: GASTROINTESTINAL NEGATIVE: 1

## 2022-04-26 ASSESSMENT — SOCIAL DETERMINANTS OF HEALTH (SDOH): HOW HARD IS IT FOR YOU TO PAY FOR THE VERY BASICS LIKE FOOD, HOUSING, MEDICAL CARE, AND HEATING?: NOT HARD AT ALL

## 2022-04-26 NOTE — PROGRESS NOTES
Subjective:      Patient ID: Jill Casey is a 54 y.o. y.o. male. Has a recliner with worn cushions. Some of the frame is exposed. Has a swollen, painful area on the buttocks. Not draining  No fever / chills    Painful. HPI      Chief Complaint   Patient presents with    Other     pain & swelling in buttocks R side > L x 6 days       No Known Allergies    Past Medical History:   Diagnosis Date    Achilles tendonitis 2011    dr Maribel Douglas of left hip 2011    Childhood asthma     childhood- no adult sx    History of colon surgery spring 2019    resection for large polyp    Strain of left knee     Dr Melissa Goodman       Past Surgical History:   Procedure Laterality Date    ABDOMEN SURGERY N/A 10/1/2020    ABDOMINAL WALL IRRIGATION AND DEBRIDEMENT performed by Tran Kerr MD at [place] [address] ARTHROSCOPY Left 2012    Dr Saud Ruelas  03/2019    sigmoid resection- polyposis / TICs    COLONOSCOPY N/A 12/18/2018    COLONOSCOPY POLYPECTOMY SNARE/COLD BIOPSY performed by Henna Zelaya MD at [address] COLONOSCOPY N/A 12/18/2018    COLONOSCOPY WITH BIOPSY performed by Henna Zelaya MD at [facility]  12/18/2018    COLONOSCOPY SUBMUCOSAL/BOTOX INJECTION performed by Henna Zelaya MD at [address] History     Socioeconomic History    Marital status:      Spouse name: Not on file    Number of children: Not on file    Years of education: Not on file    Highest education level: Not on file   Occupational History    Occupation: bank    Tobacco Use    Smoking status: Current Every Day Smoker     Packs/day: 1.00     Years: 15.00     Pack years: 15.00     Types: Cigarettes    Smokeless tobacco: Never Used   Vaping Use    Vaping Use: Never used   Substance and Sexual Activity    Alcohol use:  Yes     Alcohol/week: 0.0 standard drinks     Comment: Carly Conti Drug use: No    Sexual activity: Not Currently   Other Topics Concern    Not on file   Social History Narrative    Not on file     Social Determinants of Health     Financial Resource Strain: Low Risk     Difficulty of Paying Living Expenses: Not hard at all   Food Insecurity: No Food Insecurity    Worried About Running Out of Food in the Last Year: Never true    Gayle of Food in the Last Year: Never true   Transportation Needs: No Transportation Needs    Lack of Transportation (Medical): No    Lack of Transportation (Non-Medical): No   Physical Activity:     Days of Exercise per Week: Not on file    Minutes of Exercise per Session: Not on file   Stress:     Feeling of Stress : Not on file   Social Connections:     Frequency of Communication with Friends and Family: Not on file    Frequency of Social Gatherings with Friends and Family: Not on file    Attends Worship Services: Not on file    Active Member of [address] [place] [address] or Organizations: Not on file    Attends Club or Organization Meetings: Not on file    Marital Status: Not on file   Intimate Partner Violence:     Fear of Current or Ex-Partner: Not on file    Emotionally Abused: Not on file    Physically Abused: Not on file    Sexually Abused: Not on file   Housing Stability: Unknown    Unable to Pay for Housing in the Last Year: No    Number of [place] in the Last Year: Not on file    Unstable Housing in the Last Year: No       Family History   Problem Relation Age of Onset    Cancer Mother         lung cancer-   2019    Diabetes Neg Hx     Heart Disease Neg Hx        Vitals:    22 1507   BP: 126/84   Pulse: 112   Resp: 16   Temp: 96.9 °F (36.1 °C)       Wt Readings from Last 3 Encounters:   22 211 lb (95.7 kg)   21 251 lb (113.9 kg)   10/01/20 230 lb (104.3 kg)       Review of Systems   Constitutional: Negative for chills and fever. Has been walking and eating differently. Lost some weight. Gastrointestinal: Negative. Skin:        As the HPI       Objective:   Physical Exam  Constitutional:       Comments: Looks uncomfortable and moving slowly   Skin:     Comments: Right buttocks / gluteal cleft area-   Abscess. Neurological:      Mental Status: He is alert. Assessment:       Diagnosis Orders   1. Colon cancer screening       Abscess gluteal area  I & D      Plan:     Discussed findings. rec I & D  Options and alternates discussed. Consent- for I & D  Options discussed. 1 % lidocaine locally  Incise with # 11 blade  Copious amounts purulent material expressed  Culture sent    Abx empirically    Sitz bath 2-3 times a day  Dressing as needed    Call if fever / chills    Recheck 1 week    General inst            No current outpatient medications on file. No current facility-administered medications for this visit.

## 2022-04-29 LAB
GRAM STAIN RESULT: ABNORMAL
ORGANISM: ABNORMAL
WOUND/ABSCESS: ABNORMAL
WOUND/ABSCESS: ABNORMAL

## 2022-05-05 ENCOUNTER — OFFICE VISIT (OUTPATIENT)
Dept: FAMILY MEDICINE CLINIC | Age: 52
End: 2022-05-05
Payer: COMMERCIAL

## 2022-05-05 VITALS
DIASTOLIC BLOOD PRESSURE: 78 MMHG | SYSTOLIC BLOOD PRESSURE: 130 MMHG | OXYGEN SATURATION: 98 % | BODY MASS INDEX: 29.99 KG/M2 | WEIGHT: 209 LBS | RESPIRATION RATE: 16 BRPM | TEMPERATURE: 96.9 F | HEART RATE: 104 BPM

## 2022-05-05 DIAGNOSIS — L05.01 PILONIDAL ABSCESS: Primary | ICD-10-CM

## 2022-05-05 PROCEDURE — 99213 OFFICE O/P EST LOW 20 MIN: CPT | Performed by: FAMILY MEDICINE

## 2022-05-05 ASSESSMENT — ENCOUNTER SYMPTOMS
DIARRHEA: 0
CONSTIPATION: 0

## 2022-05-05 NOTE — PROGRESS NOTES
Subjective:      Patient ID: Benjie Mom is a 54 y.o. y.o. male. Here to follow up abscess. Is a lot better    No GI or  sx. HPI      Chief Complaint   Patient presents with    Other     1 wk f/u abcess - much better       No Known Allergies    Past Medical History:   Diagnosis Date    Achilles tendonitis 2011    dr Ricci Sparrow of left hip 2011    Childhood asthma     childhood- no adult sx    History of colon surgery spring 2019    resection for large polyp    Strain of left knee     Dr Renee Smith       Past Surgical History:   Procedure Laterality Date    ABDOMEN SURGERY N/A 10/1/2020    ABDOMINAL WALL IRRIGATION AND DEBRIDEMENT performed by Dotty Dover MD at [facility] ARTHROSCOPY Left 2012    Dr Carlito Carbone  03/2019    sigmoid resection- polyposis / TICs    COLONOSCOPY N/A 12/18/2018    COLONOSCOPY POLYPECTOMY SNARE/COLD BIOPSY performed by Kristi Baez MD at [address] [facility] COLONOSCOPY N/A 12/18/2018    COLONOSCOPY WITH BIOPSY performed by Kristi Baez MD at [address] [place] [address]  12/18/2018    COLONOSCOPY SUBMUCOSAL/BOTOX INJECTION performed by Kristi Baez MD at [facility]. [facility] History     Socioeconomic History    Marital status:      Spouse name: Not on file    Number of children: Not on file    Years of education: Not on file    Highest education level: Not on file   Occupational History    Occupation: bank    Tobacco Use    Smoking status: Current Every Day Smoker     Packs/day: 1.00     Years: 15.00     Pack years: 15.00     Types: Cigarettes    Smokeless tobacco: Never Used   Vaping Use    Vaping Use: Never used   Substance and Sexual Activity    Alcohol use: Yes     Alcohol/week: 0.0 standard drinks     Comment: occas.      Drug use: No    Sexual activity: Not Currently   Other Topics Concern    Not on file   Social History Narrative    Not on file     Social Determinants of Health     Financial Resource Strain: Low Risk     Difficulty of Paying Living Expenses: Not hard at all   Food Insecurity: No Food Insecurity    Worried About Running Out of Food in the Last Year: Never true    [address] in the Last Year: Never true   Transportation Needs: No Transportation Needs    Lack of Transportation (Medical): No    Lack of Transportation (Non-Medical): No   Physical Activity:     Days of Exercise per Week: Not on file    Minutes of Exercise per Session: Not on file   Stress:     Feeling of Stress : Not on file   Social Connections:     Frequency of Communication with Friends and Family: Not on file    Frequency of Social Gatherings with Friends and Family: Not on file    Attends Bahai Services: Not on file    Active Member of [address] [place] [address] [employer] or Organizations: Not on file    Attends Club or Organization Meetings: Not on file    Marital Status: Not on file   Intimate Partner Violence:     Fear of Current or Ex-Partner: Not on file    Emotionally Abused: Not on file    Physically Abused: Not on file    Sexually Abused: Not on file   Housing Stability: Unknown    Unable to Pay for Housing in the Last Year: No    Number of [place] in the Last Year: Not on file    Unstable Housing in the Last Year: No       Family History   Problem Relation Age of Onset    Cancer Mother         lung cancer-   2019    Diabetes Neg Hx     Heart Disease Neg Hx        Vitals:    22 1334   BP: 130/78   Pulse:    Resp:    Temp:    SpO2:        Wt Readings from Last 3 Encounters:   22 209 lb (94.8 kg)   22 211 lb (95.7 kg)   21 251 lb (113.9 kg)       Review of Systems   Constitutional: Negative for chills and fever. Gastrointestinal: Negative for constipation and diarrhea. Genitourinary: Negative for difficulty urinating. Objective:   Physical Exam  Vitals reviewed.    Constitutional:       Appearance: He is not ill-appearing. Skin:     Comments: Right gluteal / pilonidal area site much improved. Slight  Swelling. No induration'  some old scar look   Neurological:      Mental Status: He is oriented to person, place, and time. Psychiatric:         Mood and Affect: Mood normal.         Behavior: Behavior normal.         Thought Content: Thought content normal.         Judgment: Judgment normal.         Assessment:      Pilonidal abscess        Plan:     rec finish the antibiotic    rec sitz bath or heating pad 10 minutes 1-2 x a day. Recheck 3 months    call if starts to act up. Current Outpatient Medications   Medication Sig Dispense Refill    doxycycline hyclate (VIBRA-TABS) 100 MG tablet Take 1 tablet by mouth 2 times daily Take with food 20 tablet 0     No current facility-administered medications for this visit.

## 2022-05-10 ENCOUNTER — TELEPHONE (OUTPATIENT)
Dept: PULMONOLOGY | Age: 52
End: 2022-05-10

## 2022-05-10 ENCOUNTER — TELEMEDICINE (OUTPATIENT)
Dept: SLEEP MEDICINE | Age: 52
End: 2022-05-10
Payer: COMMERCIAL

## 2022-05-10 DIAGNOSIS — G47.33 SEVERE OBSTRUCTIVE SLEEP APNEA: Primary | ICD-10-CM

## 2022-05-10 DIAGNOSIS — G47.34 NOCTURNAL HYPOXIA: ICD-10-CM

## 2022-05-10 DIAGNOSIS — Z71.89 CPAP USE COUNSELING: ICD-10-CM

## 2022-05-10 DIAGNOSIS — E66.9 OBESITY (BMI 30-39.9): ICD-10-CM

## 2022-05-10 PROCEDURE — 99214 OFFICE O/P EST MOD 30 MIN: CPT | Performed by: NURSE PRACTITIONER

## 2022-05-10 ASSESSMENT — SLEEP AND FATIGUE QUESTIONNAIRES
HOW LIKELY ARE YOU TO NOD OFF OR FALL ASLEEP WHILE WATCHING TV: 1
HOW LIKELY ARE YOU TO NOD OFF OR FALL ASLEEP WHILE SITTING QUIETLY AFTER LUNCH WITHOUT ALCOHOL: 1
HOW LIKELY ARE YOU TO NOD OFF OR FALL ASLEEP WHEN YOU ARE A PASSENGER IN A CAR FOR AN HOUR WITHOUT A BREAK: 0
HOW LIKELY ARE YOU TO NOD OFF OR FALL ASLEEP IN A CAR, WHILE STOPPED FOR A FEW MINUTES IN TRAFFIC: 0
ESS TOTAL SCORE: 6
HOW LIKELY ARE YOU TO NOD OFF OR FALL ASLEEP WHILE SITTING AND TALKING TO SOMEONE: 0
HOW LIKELY ARE YOU TO NOD OFF OR FALL ASLEEP WHILE LYING DOWN TO REST IN THE AFTERNOON WHEN CIRCUMSTANCES PERMIT: 3
HOW LIKELY ARE YOU TO NOD OFF OR FALL ASLEEP WHILE SITTING INACTIVE IN A PUBLIC PLACE: 0
HOW LIKELY ARE YOU TO NOD OFF OR FALL ASLEEP WHILE SITTING AND READING: 1

## 2022-05-10 NOTE — PROGRESS NOTES
Patient ID: Demarco Bermudez is a 54 y.o. male who is being seen today for   No chief complaint on file. Referring: Dr. Urban Phillips    HPI:   Demarco Bermudez is a 54 y.o. male for televideo appointment via video and audio doxy. me virtual visit for JULIAN follow up. HST was reviewed by me and noted below. It showed moderate JULIAN and patient started auto CPAP after testing. Results were dicussed with patient and multiple good questions were answered. States he is doing well with CPAP. States he feels more resting and is sleeping better since using CPAP. Patient is using CPAP   5-7 hrs/night. Using humidifier. No snoring on CPAP. The pressure is well tolerated. The mask is somewhat comfortable-full face. No mask leak. No significant daytime sleepiness. No nodding off when driving. No dry nose or throat. No fatigue. Bedtime is 1 am and rise time is 7-8 am. Sleep onset is few minutes. Wakes up 0 times at night total. No nocturia. Occasional naps during the day. No headache in am. No weight gain. 2 caffienated beverages during the day. Occasional alcohol. ESS is 6. Initial HPI 2/1/22  Demarco Bermudez is a 54 y.o. male for televideo appointment via video and audio doxy. me virtual visit for fatigue evaluation. Patient reports snoring at night for the past 20years. Worse in supine position. Wakes self snoring. Has witnessed apnea. Sometimes restorative sleep. Sometimes dry mouth upon awakening. Fatigue and tiredness during the day. No history of sleep apnea. Bedtime MN and rise time is 7 am. It takes few minutes to fall asleep. 1 nocturia. Wakes up 1 times at night. It takes few minutes to fall back a sleep. Takes 1 nap during the day (  minutes). No headache in am. No car wrecks or near wrecks because of the sleepiness. No nodding off while driving. Gained 20 pounds in the past 2 years. No forgetfulness or decreased concentration. Drinks 2-3 caffinated beverages per day. Occasional alcohol.  No restless feelings in legs at night.  +teeth grinding. No nightmares. No sleep walking. No night time panic attacks. No narcotics. No drug abuse. No history of depression. No history of anxiety. No history of atrial fibrillation. No history of DM. No history of HTN. No history of ischemic heart disease. No history of stroke. ESS is 9. +smoking- 1 pack a day. No known FH for JULIAN, RLS or narcolepsy. Sleep Medicine 2/1/2022   Sitting and reading 3   Watching TV 2   Sitting, inactive in a public place (e.g. a theatre or a meeting) 0   As a passenger in a car for an hour without a break 0   Lying down to rest in the afternoon when circumstances permit 3   Sitting and talking to someone 0   Sitting quietly after a lunch without alcohol 1   In a car, while stopped for a few minutes in traffic 0   Total score 9       Past Medical History:  Past Medical History:   Diagnosis Date    Achilles tendonitis 2011    dr Rose An of left hip 2011    Childhood asthma     childhood- no adult sx    History of colon surgery spring 2019    resection for large polyp    Strain of left knee     Dr Cassandra Christopher       Past Surgical History:        Procedure Laterality Date    ABDOMEN SURGERY N/A 10/1/2020    ABDOMINAL WALL IRRIGATION AND DEBRIDEMENT performed by Lis Monterroso MD at [place] [address] ARTHROSCOPY Left 2012    Dr Sandra Magana  03/2019    sigmoid resection- polyposis / TICs    COLONOSCOPY N/A 12/18/2018    COLONOSCOPY POLYPECTOMY SNARE/COLD BIOPSY performed by Michele Willard MD at [facility] N/A 12/18/2018    COLONOSCOPY WITH BIOPSY performed by Michele Willard MD at [facility]  12/18/2018    COLONOSCOPY SUBMUCOSAL/BOTOX INJECTION performed by Michele Willard MD at [facility]. 53.:  has No Known Allergies.   Social History:    TOBACCO:   reports that he has been smoking cigarettes. He has a 15.00 pack-year smoking history. He has never used smokeless tobacco.  ETOH:   reports current alcohol use. Family History:       Problem Relation Age of Onset    Cancer Mother         lung cancer-   2019    Diabetes Neg Hx     Heart Disease Neg Hx        Current Medications:    Current Outpatient Medications:     doxycycline hyclate (VIBRA-TABS) 100 MG tablet, Take 1 tablet by mouth 2 times daily Take with food, Disp: 20 tablet, Rfl: 0      REVIEW OF SYSTEMS:  Review of Systems      Objective:   PHYSICAL EXAM:  There were no vitals taken for this visit. Physical Exam  Exam:  Gen: No acute distress, does not appear to be in pain. Appears well developed and nourished. HENT: Head is normocephalic and atraumatic. Normal appearing nose. External Ears normal.   Neck: No visualized mass. Trachea is midline   Eyes: EOM intact. No visible discharge. Resp:No visualized signs of difficulty breathing or respiratory distress, speaking in full sentences. Respiratory effort normal.  Neuro: Awake. Alert. Able to follow commands. No facial asymmetry. Skin: No significant lesions or discoloration noted on facial skin    Musculoskeletal: Normal range of motion of the neck. Psych: Oriented x 3. No anxiety. Normal affect. DATA:   2022 HST AHI 28.4, low SPO2 77%, under 89% 118.4 minutes. Patient started auto CPAP 8-16 cm H2O    CPAP download data:  Compliance download report from 22 to 22 reviewed today by me and showed patient is using machine 5:22 hrs/night with 87% compliance and AHI 0.1 within this time frame. 26/30days with greater than 4 hours of machine use. 90% pressure 12.7 cm H20 on auto CPAP 8-16 cm H2O     Assessment:       · Severe JULIAN. Auto CPAP 8-16 cm H2O.   Optimal compliance and efficacy on review today  · Snoring-resolved on CPAP  · Observed sleep apnea -resolved on CPAP  · Fatigue-improved with CPAP use  · Obesity        Plan:      · Send order to change to auto CPAP 10- 15 cm H2O  · ONPO on auto CPAP 10-15 cm H2O   Advised to use CPAP 6-8 hrs at night and during naps. Replacement of mask, tubing, head straps every 3-6 months or sooner if damaged. Patient instructed to contact DME company for any mask, tubing or machine trouble shooting if problems arise. · Sleep hygiene  · Avoidsedatives, alcohol and caffeinated drinks at bed time. · No driving motorized vehicles or operating heavy machinery while fatigue, drowsy or sleepy. · Weight loss is also recommended as a long-term intervention. · Complications of JULIAN if not treated were discussed with patient patient to include systemic hypertension, pulmonary hypertension, cardiovascular morbidities, car accidents and all cause mortality. Discussed pathophysiology of JULIAN with patient today  Patient education handout provided regarding sleep tips     Xiomara [place], was evaluated through a synchronous (real-time) audio-video encounter. The patient (or guardian if applicable) is aware that this is a billable service, which includes applicable co-pays. This Virtual Visit was conducted with patient's (and/or legal guardian's) consent. The visit was conducted pursuant to the emergency declaration under the [address] [place] [address], [address] [place] [address] authority and the [employer] General Act. Patient identification was verified, and a caregiver was present when appropriate. The patient was located at home in a state where the provider was licensed to provide care. Total time spent for this encounter: Not billed by time    --SHAAN Melendez CNP on 5/10/2022 at 2:52 PM    An electronic signature was used to authenticate this note.

## 2022-05-10 NOTE — PATIENT INSTRUCTIONS

## 2022-05-23 NOTE — TELEPHONE ENCOUNTER
Spoke to Jose Miner at [place] and she said that they were wanting to know if the patient was needing the ONPO to qualify for o2. I informed them that the test needs to be completed to ensure o2 levels are being controlled while on CPAP and to please complete testing ASAP.

## 2022-05-27 NOTE — TELEPHONE ENCOUNTER
ONPO from 5/28/2022 on auto CPAP 10-15 cm H2O reviewed. Showed under 88% for 16 seconds.     O2 is good on current pressure

## 2022-06-03 ENCOUNTER — TELEPHONE (OUTPATIENT)
Dept: FAMILY MEDICINE CLINIC | Age: 52
End: 2022-06-03

## 2022-06-03 DIAGNOSIS — L05.01 PILONIDAL ABSCESS: Primary | ICD-10-CM

## 2022-06-03 RX ORDER — DOXYCYCLINE HYCLATE 100 MG
100 TABLET ORAL 2 TIMES DAILY
Qty: 30 TABLET | Refills: 0 | Status: SHIPPED | OUTPATIENT
Start: 2022-06-03 | End: 2022-07-14 | Stop reason: ALTCHOICE

## 2022-06-03 NOTE — TELEPHONE ENCOUNTER
Patient called in to report that his abscess was coming back that he saw Dr. Woodrow Marrero about on 5/05. Patient states that he was advised to call if it returns so that he can do another round of antibiotics. Pls advise    Send to Sushant Land on file.     5/5/2022 last ov    Future Appointments   Date Time Provider Riya Yung   8/4/2022  1:20 PM DO KRUPA Curtis - [facility]   5/9/2023  1:20 PM SHAAN Sierra CNP [facility]

## 2022-06-03 NOTE — TELEPHONE ENCOUNTER
Please tell the patient that I sent a refill antibiotic. Because it recurred so quickly I would like him to see surgery about possible removal or surgical work on the cyst to get rid of it. I did a referral to Dr. Jelena Mathew. Please give Dolly Anton the contact information.   Thank you

## 2022-06-09 ENCOUNTER — INITIAL CONSULT (OUTPATIENT)
Dept: SURGERY | Age: 52
End: 2022-06-09
Payer: COMMERCIAL

## 2022-06-09 VITALS
BODY MASS INDEX: 30.49 KG/M2 | HEART RATE: 90 BPM | SYSTOLIC BLOOD PRESSURE: 132 MMHG | WEIGHT: 213 LBS | HEIGHT: 70 IN | DIASTOLIC BLOOD PRESSURE: 83 MMHG

## 2022-06-09 DIAGNOSIS — L05.91 PILONIDAL CYST: Primary | ICD-10-CM

## 2022-06-09 PROCEDURE — 99205 OFFICE O/P NEW HI 60 MIN: CPT | Performed by: SURGERY

## 2022-06-10 ENCOUNTER — TELEPHONE (OUTPATIENT)
Dept: SURGERY | Age: 52
End: 2022-06-10

## 2022-06-10 NOTE — PROGRESS NOTES
Department of General Surgery Consult    PATIENT NAME: Marixa Chong OF BIRTH: 1970    ADMISSION DATE: No admission date for patient encounter. TODAY'S DATE: 6/9/22    Reason for Consult:  Pilonidal cyst    Chief Complaint: pain    Requesting Physician:  Shubham Flores    HISTORY OF PRESENT ILLNESS:              The patient is a 54 y.o. male who presents with pain and swellign in pilonidal region. Reports having cyst in this area get infected multiple times and required I&D multiple times as well. Currently on abx and drainage minimal.  Pain minimal now too. Also with a cyst on right thigh causing some swelling and pain at times as well. .    Past Medical History:        Diagnosis Date    Achilles tendonitis 2011    dr Rayray Terrazas of left hip 2011    Childhood asthma     childhood- no adult sx    History of colon surgery spring 2019    resection for large polyp    Strain of left knee     Dr Meade Limb       Past Surgical History:        Procedure Laterality Date    ABDOMEN SURGERY N/A 10/1/2020    ABDOMINAL WALL IRRIGATION AND DEBRIDEMENT performed by Vikram Knight MD at [place] [address] ARTHROSCOPY Left 2012    Dr Kaila Benson  03/2019    sigmoid resection- polyposis / TICs    COLONOSCOPY N/A 12/18/2018    COLONOSCOPY POLYPECTOMY SNARE/COLD BIOPSY performed by Shanta Weinstein MD at [address] [facility] COLONOSCOPY N/A 12/18/2018    COLONOSCOPY WITH BIOPSY performed by Shanta Weinstein MD at [facility]  12/18/2018    COLONOSCOPY SUBMUCOSAL/BOTOX INJECTION performed by Shanta Weinstein MD at [address] [facility]       Current Medications:   No current facility-administered medications for this visit. Prior to Admission medications    Medication Sig Start Date End Date Taking?  Authorizing Provider   doxycycline hyclate (VIBRA-TABS) 100 MG tablet Take 1 tablet by mouth 2 times daily Take with food 6/3/22  Yes Adriana Aguiar DO        Allergies:  Patient has no known allergies. Social History:   Social History     Socioeconomic History    Marital status:      Spouse name: Not on file    Number of children: Not on file    Years of education: Not on file    Highest education level: Not on file   Occupational History    Occupation: bank    Tobacco Use    Smoking status: Current Every Day Smoker     Packs/day: 1.00     Years: 15.00     Pack years: 15.00     Types: Cigarettes    Smokeless tobacco: Never Used   Vaping Use    Vaping Use: Never used   Substance and Sexual Activity    Alcohol use: Yes     Alcohol/week: 0.0 standard drinks     Comment: occas.  Drug use: No    Sexual activity: Not Currently   Other Topics Concern    Not on file   Social History Narrative    Not on file     Social Determinants of Health     Financial Resource Strain: Low Risk     Difficulty of Paying Living Expenses: Not hard at all   Food Insecurity: No Food Insecurity    Worried About Running Out of Food in the Last Year: Never true    Gayle of Food in the Last Year: Never true   Transportation Needs: No Transportation Needs    Lack of Transportation (Medical): No    Lack of Transportation (Non-Medical):  No   Physical Activity:     Days of Exercise per Week: Not on file    Minutes of Exercise per Session: Not on file   Stress:     Feeling of Stress : Not on file   Social Connections:     Frequency of Communication with Friends and Family: Not on file    Frequency of Social Gatherings with Friends and Family: Not on file    Attends Zoroastrian Services: Not on file    Active Member of Clubs or Organizations: Not on file    Attends Club or Organization Meetings: Not on file    Marital Status: Not on file   Intimate Partner Violence:     Fear of Current or Ex-Partner: Not on file    Emotionally Abused: Not on file    Physically Abused: Not on file    Sexually Abused: Not on file   Housing Stability: Unknown    Unable to Pay for Housing in the Last Year: No    Number of Places Lived in the Last Year: Not on file    Unstable Housing in the Last Year: No       Family History:        Problem Relation Age of Onset    Cancer Mother         lung cancer-   2019    Diabetes Neg Hx     Heart Disease Neg Hx        REVIEW OF SYSTEMS:  CONSTITUTIONAL:  negative  HEENT:  negative  RESPIRATORY:  negative  CARDIOVASCULAR:  negative  GASTROINTESTINAL:  negative  GENITOURINARY:  negative  HEMATOLOGIC/LYMPHATIC:  negative  NEUROLOGICAL:  Negative  * All other ROS reviewed and negative. PHYSICAL EXAM:  VITALS:  /83   Pulse 90   Ht 5' 10\" (1.778 m)   Wt 213 lb (96.6 kg)   BMI 30.56 kg/m²   24HR INTAKE/OUTPUT:    [unfilled]  @[employer]@      CONSTITUTIONAL:  alert, no apparent distress and mildly obese  EYES:  PERRL, sclera clear  ENT:  Normocephalic,atraumatic, without obvious abnormality  NECK:  supple, symmetrical, trachea midline  LUNGS: Resp effort easy and unlabored, no crackles or wheezing  CARDIOVASCULAR:  NO JVD, regular rate   ABDOMEN:  , normal bowel sounds, soft, non-distended, non-tender  MUSCULOSKELETAL: No clubbing or cyanosis, 0+ pitting edema lower extremities  NEUROLOGIC:  Mental Status Exam:  Level of Alertness:   awake  PSYCHIATRIC:   person, place, time  SKIN:  Pilonidal cyst - no erythema or drainage, right thigh cyst - no drainage or erythema    DATA:    CBC: No results for input(s): WBC, HGB, HCT, PLT in the last 72 hours. BMP:  No results for input(s): NA, K, CL, CO2, BUN, CREATININE, GLUCOSE in the last 72 hours. Hepatic: No results for input(s): AST, ALT, ALB, BILITOT, ALKPHOS in the last 72 hours. Mag:    No results for input(s): MG in the last 72 hours. Phos:   No results for input(s): PHOS in the last 72 hours. INR: No results for input(s): INR in the last 72 hours. Radiology Review: Images personally reviewed by me. NA      IMPRESSION/RECOMMENDATIONS:    55 yo with right thigh and pilonidal cysts  1. Finish course of abx  2. Patient wishing to wait a little while before having excision. If recurrent symptoms would do sooner  3. Risks of operation and typical recovery discussed.     Electronically signed by Oneil Li, [address] [place] [address] Surgery  [id number]

## 2022-06-10 NOTE — LETTER
Surgery Scheduling Form:  DEMOGRAPHICS:                                                                                                         .  Patient Name:  Erik Young  Patient :  1970   Patient SS#:      Patient Phone:  [phone] (home)                         Alt. Patient Phone:                 Patient Address:  [address] [place] [address]  PCP:  Adriana Aguiar DO  Insurance:  Payor: Joselin Klein / Plan: Joselin Klein - [place] PPO / Product Type: *No Product type* /        Insurance ID Number:    Payor/Plan Subscr  Sex Relation Sub. Ins. ID Effective Group Num   1. [address] -* Noé Noé II 1970 Male Self [id number]                                   [address]     Interpretor Needed:  (NO)  (TYPE)           LATEX ALLERGY:  (NO)  Allergies: No known drug allergies  Defibulator or Pacemaker:  (NO)    DIAGNOSIS & PROCEDURE:                                                                                       .  Diagnosis Code/Description:   Pilonidal cyst L05.91  Operation Code/Description:  Surgical removal of pilonidal cyst and right thigh cyst [id number]  Location:  [facility]       Surgeon:  Dr. Portia Polk    SCHEDULING INFORMATION:                                                                                    .  Surgeon's Scheduling Instruction:  elective  Requested Date: 22     OR Time: 730 am            Patient Arrival Time: 6 am  OR Time Required:  90  Minutes  Anesthesia:  General       Equipment:  n/a                                                            SA Required (only for Mac and Gen): yes  Status:  Outpatient        Standard C-Arm (only for port and mellisa):  n/a   Mini C-Arm: No   PAT Required:   Yes                                          H&P needs to be completed: yes  Cardiac Clearance Requested:  (NO)               PRE-CERTIFICATION INFORMATION: Henry Corbett   Procedure/CPT code:  Surgical removal of pilonidal cyst and right thigh cyst [id number]        Modifier:

## 2022-06-10 NOTE — TELEPHONE ENCOUNTER
Spoke with the patient, scheduled for pilondial excision and cyst removal on 8/5/22 at 730 am with an arrival of 6 am. Informed the patient that PAT will also call with further instructions. Informed the patient that [facility] paperwork can be sent to our office and will be completed in a timely manner. Patient verbally states that he/she understands pre-op instructions. Patient notified of pre-op instructions for general/mac anesthesia:    *NPO after midnight     *H&P prior to surgery - Dr. Billy Claros    *Cardiac clearance, if needed. - n/a    *Stop all blood thinners, if needed. *Will need a     *Call the office with any further questions. *Procedure/Surgery time at this point is tentative time as PAT will confirm arrival time.

## 2022-06-10 NOTE — PROGRESS NOTES
PROTEIN ___________    (__) VITAMIN D HYDROXY ___________  (__) BLOOD THINNERS __________    (__) ACE/ ARBS: _____________________     (__) BETABLOCKERS __________________

## 2022-07-14 ENCOUNTER — OFFICE VISIT (OUTPATIENT)
Dept: FAMILY MEDICINE CLINIC | Age: 52
End: 2022-07-14
Payer: COMMERCIAL

## 2022-07-14 VITALS
OXYGEN SATURATION: 97 % | RESPIRATION RATE: 16 BRPM | HEIGHT: 69 IN | SYSTOLIC BLOOD PRESSURE: 120 MMHG | HEART RATE: 90 BPM | DIASTOLIC BLOOD PRESSURE: 80 MMHG | BODY MASS INDEX: 31.55 KG/M2 | TEMPERATURE: 96.9 F | WEIGHT: 213 LBS

## 2022-07-14 DIAGNOSIS — K63.5 POLYP OF COLON, UNSPECIFIED PART OF COLON, UNSPECIFIED TYPE: ICD-10-CM

## 2022-07-14 DIAGNOSIS — L05.91 PILONIDAL CYST: Primary | ICD-10-CM

## 2022-07-14 DIAGNOSIS — Z01.818 PRE-OP EXAM: ICD-10-CM

## 2022-07-14 PROCEDURE — 99214 OFFICE O/P EST MOD 30 MIN: CPT | Performed by: FAMILY MEDICINE

## 2022-07-14 RX ORDER — CEPHALEXIN 500 MG/1
CAPSULE ORAL
COMMUNITY
Start: 2022-07-06 | End: 2022-07-14 | Stop reason: SDUPTHER

## 2022-07-14 RX ORDER — CEPHALEXIN 500 MG/1
500 CAPSULE ORAL 3 TIMES DAILY
Qty: 20 CAPSULE | Refills: 0 | Status: SHIPPED | OUTPATIENT
Start: 2022-07-14 | End: 2022-07-27 | Stop reason: ALTCHOICE

## 2022-07-14 RX ORDER — SULFAMETHOXAZOLE AND TRIMETHOPRIM 800; 160 MG/1; MG/1
1 TABLET ORAL 2 TIMES DAILY
COMMUNITY
Start: 2022-07-05 | End: 2022-07-16

## 2022-07-14 ASSESSMENT — ENCOUNTER SYMPTOMS
TROUBLE SWALLOWING: 0
GASTROINTESTINAL NEGATIVE: 1
SORE THROAT: 0
RESPIRATORY NEGATIVE: 1

## 2022-07-14 ASSESSMENT — PATIENT HEALTH QUESTIONNAIRE - PHQ9
2. FEELING DOWN, DEPRESSED OR HOPELESS: 0
SUM OF ALL RESPONSES TO PHQ QUESTIONS 1-9: 0
1. LITTLE INTEREST OR PLEASURE IN DOING THINGS: 0
SUM OF ALL RESPONSES TO PHQ QUESTIONS 1-9: 0
SUM OF ALL RESPONSES TO PHQ9 QUESTIONS 1 & 2: 0
SUM OF ALL RESPONSES TO PHQ QUESTIONS 1-9: 0
SUM OF ALL RESPONSES TO PHQ QUESTIONS 1-9: 0

## 2022-07-14 NOTE — PROGRESS NOTES
Subjective:      Patient ID: Barbara Vasquez is a 54 y.o. y.o. male. Here for pre-op physical  Has a recurring pilonidal cyst.  Recent infection requiring  I & D x 2 in the past. 2 months    Going to have more definitive surgery    Meds and hx reviewed. HPI      Chief Complaint   Patient presents with    Pre-op Exam     infection on [facility], 8/5/22, Dr. Li Dill - no EKG required       No Known Allergies    Past Medical History:   Diagnosis Date    Achilles tendonitis 2011    dr Mensah Chamber of left hip 2011    Childhood asthma     childhood- no adult sx    History of colon surgery spring 2019    resection for large polyp    Strain of left knee     Dr Lemus [place]       Past Surgical History:   Procedure Laterality Date    ABDOMEN SURGERY N/A 10/1/2020    ABDOMINAL WALL IRRIGATION AND DEBRIDEMENT performed by Kj Rivero MD at [address] ARTHROSCOPY Left 2012    Dr Emil Meng  03/2019    sigmoid resection- polyposis / TICs    COLONOSCOPY N/A 12/18/2018    COLONOSCOPY POLYPECTOMY SNARE/COLD BIOPSY performed by Nato Souza MD at [facility] N/A 12/18/2018    COLONOSCOPY WITH BIOPSY performed by Nato Souza MD at [address] [place] [address]  12/18/2018    COLONOSCOPY SUBMUCOSAL/BOTOX INJECTION performed by Nato Souza MD at [facility]. [facility] History     Socioeconomic History    Marital status:      Spouse name: Not on file    Number of children: Not on file    Years of education: Not on file    Highest education level: Not on file   Occupational History    Occupation: bank    Tobacco Use    Smoking status: Current Every Day Smoker     Packs/day: 1.00     Years: 15.00     Pack years: 15.00     Types: Cigarettes    Smokeless tobacco: Never Used   Vaping Use    Vaping Use: Never used   Substance and Sexual Activity    Alcohol use:  Yes Alcohol/week: 0.0 standard drinks     Comment: occas. Drug use: No    Sexual activity: Not Currently   Other Topics Concern    Not on file   Social History Narrative    Not on file     Social Determinants of Health     Financial Resource Strain: Low Risk     Difficulty of Paying Living Expenses: Not hard at all   Food Insecurity: No Food Insecurity    Worried About Running Out of Food in the Last Year: Never true    [address] in the Last Year: Never true   Transportation Needs: No Transportation Needs    Lack of Transportation (Medical): No    Lack of Transportation (Non-Medical): No   Physical Activity:     Days of Exercise per Week: Not on file    Minutes of Exercise per Session: Not on file   Stress:     Feeling of Stress : Not on file   Social Connections:     Frequency of Communication with Friends and Family: Not on file    Frequency of Social Gatherings with Friends and Family: Not on file    Attends [employer] Services: Not on file    Active Member of Clubs or Organizations: Not on file    Attends Club or Organization Meetings: Not on file    Marital Status: Not on file   Intimate Partner Violence:     Fear of Current or Ex-Partner: Not on file    Emotionally Abused: Not on file    Physically Abused: Not on file    Sexually Abused: Not on file   Housing Stability: Unknown    Unable to Pay for Housing in the Last Year: No    Number of Places Lived in the Last Year: Not on file    Unstable Housing in the Last Year: No       Family History   Problem Relation Age of Onset    Cancer Mother         lung cancer-   2019    Diabetes Neg Hx     Heart Disease Neg Hx        Vitals:    22 1105   BP: 120/80   Pulse: 90   Resp: 16   Temp: 96.9 °F (36.1 °C)   SpO2: 97%       Wt Readings from Last 3 Encounters:   22 213 lb (96.6 kg)   22 213 lb (96.6 kg)   22 209 lb (94.8 kg)       Review of Systems   Constitutional: Negative for activity change and unexpected weight change.    HENT: Negative for sore throat and trouble swallowing. Eyes:        Lazy eye- right   Respiratory: Negative. + smoker -  Discussed. Cardiovascular: Negative. Gastrointestinal: Negative. Genitourinary: Negative. Neurological: Negative. Psychiatric/Behavioral: Negative. Objective:   Physical Exam  Vitals reviewed. Constitutional:       Appearance: He is not ill-appearing. Eyes:      General: No scleral icterus. Comments: Lazy eye - right   Some proptosis   Cardiovascular:      Rate and Rhythm: Normal rate and regular rhythm. Pulses: Normal pulses. Heart sounds: Normal heart sounds. No murmur heard. Pulmonary:      Effort: Pulmonary effort is normal. No respiratory distress. Breath sounds: Normal breath sounds. Abdominal:      General: Bowel sounds are normal. There is no distension. Palpations: There is no mass. Comments: Pilonidal cyst-  More toward right   Musculoskeletal:         General: Normal range of motion. Right lower leg: No edema. Left lower leg: No edema. Lymphadenopathy:      Cervical: No cervical adenopathy. Skin:     General: Skin is warm and dry. Neurological:      Mental Status: He is alert and oriented to person, place, and time. Psychiatric:         Mood and Affect: Mood normal.         Behavior: Behavior normal.         Thought Content: Thought content normal.         Assessment:       Diagnosis Orders   1. Colon cancer screening       Pilonidal cyst  Pre-op    Tobacco use    Plan:     Stable -  Ok for cyst surgery  Smoking cessation and decreased discussed. Strategies discussed. Discussed colon follow up,  Hx polyps,  cologuard not appropriate. Due for follow- refer Dr Lisha Mckoy.     Current Outpatient Medications   Medication Sig Dispense Refill    cephALEXin (KEFLEX) 500 MG capsule TAKE 1 CAPSULE BY MOUTH EVERY 6 HOURS FOR 10 DAYS      sulfamethoxazole-trimethoprim (BACTRIM DS;SEPTRA DS) 800-160 MG per tablet Take 1 tablet by mouth 2 times daily       No current facility-administered medications for this visit.

## 2022-07-27 NOTE — PROGRESS NOTES
1. Do not eat or drink anything after 12 midnight prior to surgery. This includes no water, chewing gum mints, or ice chips. You may brush your teeth and gargle the day of surgery but DO NOT SWALLOW THE WATER. 2. Please see your family doctor/pediatrician for a history and physical and/or concerning medications. Bring any test results/reports from your physician's office. If you are under the care of a heart doctor or specialist please be aware that you may be asked to see him or her for clearance. 3. You may be asked to stop blood thinners such as Coumadin, Plavix, Fragmin, and Lovenox or Anti-inflammatories such as Aspirin, Ibuprofen, Advil, and Naproxen prior to your surgery. Please check with your doctor before stopping these or any other medications. 4. Do not smoke, and do not drink any alcoholic beverages 24 hours prior to surgery. 5. You MUST make arrangements for a responsible adult to take you home after your surgery. For your safety, you will not be allowed to leave alone or drive yourself home. Your surgery will be cancelled if you do not have a ride home. Also for your safety, it is strongly suggested someone stay with you the first 24 hrs after your surgery. 6. A parent/legal guardian must accompany a child scheduled for surgery and plan to stay at the hospital until the child is discharged. Please do not bring other children with you. 7. For your comfort,please wear simple, loose fitting clothing to the hospital.  Please do not bring valuables (money, credit cards, checkbooks, etc.) Do not wear any makeup (including no eye makeup) or nail polish on your fingers or toes. 8. For your safety, please DO NOT wear any jewelry or piercings on day of surgery. All body piercing jewelry must be removed. 9. If you have dentures, they will be removed before going to the OR; for your convenience we will provide you with a container.   If you wear contact lenses or glasses, they will be removed, they will be removed, please bring a case for them. 10. If appicable,Please see your family doctor/pediatrician for a history & physical and/or concerning medications. Bring any test results/reports from your physician's office. 11. Remember to bring Blood Bank bracelet to the hospital on the day of surgery. 12. If you have a Living Will and Durable Power of  for Healthcare, please bring in a copy. 15. Notify your Surgeon if you develop any illness between now and surgery  time, cough, cold, fever, sore throat, nausea, vomiting, etc.  Please notify your surgeon if you experience dizziness, shortness of breath or blurred vision between now & the time of your surgery   14. DO NOT shave your operative site 96 hours prior to surgery. For face & neck surgery, men may use an electric razor 48 hours prior to surgery. 15. Shower the night before surgery with ___Antibacterial soap ___Hibiclens   16. To provide excellent care visitors will be limited to one in the room at any given time. 17.  Please bring picture ID and insurance card. 18.  Visit our web site for additional information:  [email or web address].

## 2022-07-27 NOTE — PROGRESS NOTES
Preoperative Screening for Elective Surgery/Invasive Procedures While COVID-19 present in the community    Have you had any of the following symptoms? Fever, chills  Cough  Shortness of breath  Muscle aches/pain  Diarrhea  Abdominal pain, nausea, vomiting  Loss or decrease in taste and / or smell  Risk of Exposure  Have you recently been hospitalized for COVID-19 or flu-like illness, if so when? Recently diagnosed with COVID-19, if so when? Recently tested for COVID-19, if so when? Have you been in close contact with a person or family member who currently has or recently had COVID-23? If yes, when and in what context? Do you live with anybody who in the last 14 days has had fever, chills, shortness of breath, muscle aches, flu-like illness? Do you have any close contacts or family members who are currently in the hospital for COVID-19 or flu-like illness? If yes, assess recent close contact with this person. Indicate if the patient has a positive screen by answering yes to one or more of the above questions. Patients who test positive or screen positive prior to surgery or on the day of surgery should be evaluated in conjunction with the surgeon/proceduralist/anesthesiologist to determine the urgency of the procedure.     Pt states symptom free

## 2022-08-04 ENCOUNTER — ANESTHESIA EVENT (OUTPATIENT)
Dept: OPERATING ROOM | Age: 52
End: 2022-08-04
Payer: COMMERCIAL

## 2022-08-05 ENCOUNTER — HOSPITAL ENCOUNTER (OUTPATIENT)
Age: 52
Setting detail: OUTPATIENT SURGERY
Discharge: HOME | End: 2022-08-05
Attending: SURGERY | Admitting: SURGERY
Payer: COMMERCIAL

## 2022-08-05 ENCOUNTER — ANESTHESIA (OUTPATIENT)
Dept: OPERATING ROOM | Age: 52
End: 2022-08-05
Payer: COMMERCIAL

## 2022-08-05 ENCOUNTER — TELEPHONE (OUTPATIENT)
Dept: SURGERY | Age: 52
End: 2022-08-05

## 2022-08-05 VITALS
TEMPERATURE: 97 F | HEART RATE: 79 BPM | SYSTOLIC BLOOD PRESSURE: 97 MMHG | HEIGHT: 70 IN | DIASTOLIC BLOOD PRESSURE: 52 MMHG | BODY MASS INDEX: 30.78 KG/M2 | WEIGHT: 215 LBS | RESPIRATION RATE: 24 BRPM | OXYGEN SATURATION: 92 %

## 2022-08-05 DIAGNOSIS — L05.91 PILONIDAL CYST: ICD-10-CM

## 2022-08-05 DIAGNOSIS — G89.18 POSTOPERATIVE PAIN: Primary | ICD-10-CM

## 2022-08-05 LAB
EKG ATRIAL RATE: 80 BPM
EKG DIAGNOSIS: NORMAL
EKG P AXIS: 72 DEGREES
EKG P-R INTERVAL: 134 MS
EKG Q-T INTERVAL: 394 MS
EKG QRS DURATION: 142 MS
EKG QTC CALCULATION (BAZETT): 454 MS
EKG R AXIS: -51 DEGREES
EKG T AXIS: 24 DEGREES
EKG VENTRICULAR RATE: 80 BPM

## 2022-08-05 PROCEDURE — 3600000002 HC SURGERY LEVEL 2 BASE: Performed by: SURGERY

## 2022-08-05 PROCEDURE — 2500000003 HC RX 250 WO HCPCS: Performed by: NURSE ANESTHETIST, CERTIFIED REGISTERED

## 2022-08-05 PROCEDURE — 6360000002 HC RX W HCPCS: Performed by: SURGERY

## 2022-08-05 PROCEDURE — 88304 TISSUE EXAM BY PATHOLOGIST: CPT

## 2022-08-05 PROCEDURE — 93010 ELECTROCARDIOGRAM REPORT: CPT | Performed by: INTERNAL MEDICINE

## 2022-08-05 PROCEDURE — 11771 EXC PILONIDAL CYST XTNSV: CPT | Performed by: SURGERY

## 2022-08-05 PROCEDURE — 3700000001 HC ADD 15 MINUTES (ANESTHESIA): Performed by: SURGERY

## 2022-08-05 PROCEDURE — 6360000002 HC RX W HCPCS: Performed by: NURSE ANESTHETIST, CERTIFIED REGISTERED

## 2022-08-05 PROCEDURE — 3700000000 HC ANESTHESIA ATTENDED CARE: Performed by: SURGERY

## 2022-08-05 PROCEDURE — 7100000000 HC PACU RECOVERY - FIRST 15 MIN: Performed by: SURGERY

## 2022-08-05 PROCEDURE — 2580000003 HC RX 258: Performed by: NURSE ANESTHETIST, CERTIFIED REGISTERED

## 2022-08-05 PROCEDURE — 7100000011 HC PHASE II RECOVERY - ADDTL 15 MIN: Performed by: SURGERY

## 2022-08-05 PROCEDURE — 7100000001 HC PACU RECOVERY - ADDTL 15 MIN: Performed by: SURGERY

## 2022-08-05 PROCEDURE — 7100000010 HC PHASE II RECOVERY - FIRST 15 MIN: Performed by: SURGERY

## 2022-08-05 PROCEDURE — 3600000012 HC SURGERY LEVEL 2 ADDTL 15MIN: Performed by: SURGERY

## 2022-08-05 PROCEDURE — 2709999900 HC NON-CHARGEABLE SUPPLY: Performed by: SURGERY

## 2022-08-05 PROCEDURE — 93005 ELECTROCARDIOGRAM TRACING: CPT | Performed by: ANESTHESIOLOGY

## 2022-08-05 PROCEDURE — 2500000003 HC RX 250 WO HCPCS: Performed by: SURGERY

## 2022-08-05 RX ORDER — SODIUM CHLORIDE 0.9 % (FLUSH) 0.9 %
5-40 SYRINGE (ML) INJECTION EVERY 12 HOURS SCHEDULED
Status: DISCONTINUED | OUTPATIENT
Start: 2022-08-05 | End: 2022-08-05 | Stop reason: HOSPADM

## 2022-08-05 RX ORDER — SODIUM CHLORIDE 9 MG/ML
25 INJECTION, SOLUTION INTRAVENOUS PRN
Status: DISCONTINUED | OUTPATIENT
Start: 2022-08-05 | End: 2022-08-05 | Stop reason: HOSPADM

## 2022-08-05 RX ORDER — SODIUM CHLORIDE, SODIUM LACTATE, POTASSIUM CHLORIDE, CALCIUM CHLORIDE 600; 310; 30; 20 MG/100ML; MG/100ML; MG/100ML; MG/100ML
INJECTION, SOLUTION INTRAVENOUS CONTINUOUS PRN
Status: DISCONTINUED | OUTPATIENT
Start: 2022-08-05 | End: 2022-08-05 | Stop reason: SDUPTHER

## 2022-08-05 RX ORDER — MIDAZOLAM HYDROCHLORIDE 1 MG/ML
INJECTION INTRAMUSCULAR; INTRAVENOUS PRN
Status: DISCONTINUED | OUTPATIENT
Start: 2022-08-05 | End: 2022-08-05 | Stop reason: SDUPTHER

## 2022-08-05 RX ORDER — MEPERIDINE HYDROCHLORIDE 50 MG/ML
12.5 INJECTION INTRAMUSCULAR; INTRAVENOUS; SUBCUTANEOUS EVERY 5 MIN PRN
Status: DISCONTINUED | OUTPATIENT
Start: 2022-08-05 | End: 2022-08-05 | Stop reason: HOSPADM

## 2022-08-05 RX ORDER — SODIUM CHLORIDE 9 MG/ML
INJECTION, SOLUTION INTRAVENOUS PRN
Status: DISCONTINUED | OUTPATIENT
Start: 2022-08-05 | End: 2022-08-05 | Stop reason: HOSPADM

## 2022-08-05 RX ORDER — OXYCODONE HYDROCHLORIDE 5 MG/1
5-10 TABLET ORAL EVERY 6 HOURS PRN
Qty: 12 TABLET | Refills: 0 | Status: SHIPPED | OUTPATIENT
Start: 2022-08-05 | End: 2022-08-08

## 2022-08-05 RX ORDER — OXYCODONE HYDROCHLORIDE 5 MG/1
5 TABLET ORAL PRN
Status: DISCONTINUED | OUTPATIENT
Start: 2022-08-05 | End: 2022-08-05 | Stop reason: HOSPADM

## 2022-08-05 RX ORDER — ROCURONIUM BROMIDE 10 MG/ML
INJECTION, SOLUTION INTRAVENOUS PRN
Status: DISCONTINUED | OUTPATIENT
Start: 2022-08-05 | End: 2022-08-05 | Stop reason: SDUPTHER

## 2022-08-05 RX ORDER — FENTANYL CITRATE 50 UG/ML
INJECTION, SOLUTION INTRAMUSCULAR; INTRAVENOUS PRN
Status: DISCONTINUED | OUTPATIENT
Start: 2022-08-05 | End: 2022-08-05 | Stop reason: SDUPTHER

## 2022-08-05 RX ORDER — DEXAMETHASONE SODIUM PHOSPHATE 4 MG/ML
INJECTION, SOLUTION INTRA-ARTICULAR; INTRALESIONAL; INTRAMUSCULAR; INTRAVENOUS; SOFT TISSUE PRN
Status: DISCONTINUED | OUTPATIENT
Start: 2022-08-05 | End: 2022-08-05 | Stop reason: SDUPTHER

## 2022-08-05 RX ORDER — PROPOFOL 10 MG/ML
INJECTION, EMULSION INTRAVENOUS PRN
Status: DISCONTINUED | OUTPATIENT
Start: 2022-08-05 | End: 2022-08-05 | Stop reason: SDUPTHER

## 2022-08-05 RX ORDER — LIDOCAINE HYDROCHLORIDE 10 MG/ML
INJECTION, SOLUTION EPIDURAL; INFILTRATION; INTRACAUDAL; PERINEURAL PRN
Status: DISCONTINUED | OUTPATIENT
Start: 2022-08-05 | End: 2022-08-05 | Stop reason: SDUPTHER

## 2022-08-05 RX ORDER — BUPIVACAINE HYDROCHLORIDE AND EPINEPHRINE 5; 5 MG/ML; UG/ML
INJECTION, SOLUTION PERINEURAL PRN
Status: DISCONTINUED | OUTPATIENT
Start: 2022-08-05 | End: 2022-08-05 | Stop reason: ALTCHOICE

## 2022-08-05 RX ORDER — ONDANSETRON 2 MG/ML
4 INJECTION INTRAMUSCULAR; INTRAVENOUS EVERY 10 MIN PRN
Status: DISCONTINUED | OUTPATIENT
Start: 2022-08-05 | End: 2022-08-05 | Stop reason: HOSPADM

## 2022-08-05 RX ORDER — SODIUM CHLORIDE 0.9 % (FLUSH) 0.9 %
5-40 SYRINGE (ML) INJECTION PRN
Status: DISCONTINUED | OUTPATIENT
Start: 2022-08-05 | End: 2022-08-05 | Stop reason: HOSPADM

## 2022-08-05 RX ORDER — SODIUM CHLORIDE, SODIUM LACTATE, POTASSIUM CHLORIDE, CALCIUM CHLORIDE 600; 310; 30; 20 MG/100ML; MG/100ML; MG/100ML; MG/100ML
INJECTION, SOLUTION INTRAVENOUS CONTINUOUS
Status: DISCONTINUED | OUTPATIENT
Start: 2022-08-05 | End: 2022-08-05 | Stop reason: HOSPADM

## 2022-08-05 RX ORDER — HYDRALAZINE HYDROCHLORIDE 20 MG/ML
5 INJECTION INTRAMUSCULAR; INTRAVENOUS
Status: DISCONTINUED | OUTPATIENT
Start: 2022-08-05 | End: 2022-08-05 | Stop reason: HOSPADM

## 2022-08-05 RX ORDER — MIDAZOLAM HYDROCHLORIDE 1 MG/ML
1 INJECTION INTRAMUSCULAR; INTRAVENOUS EVERY 5 MIN PRN
Status: DISCONTINUED | OUTPATIENT
Start: 2022-08-05 | End: 2022-08-05 | Stop reason: HOSPADM

## 2022-08-05 RX ORDER — ONDANSETRON 2 MG/ML
INJECTION INTRAMUSCULAR; INTRAVENOUS PRN
Status: DISCONTINUED | OUTPATIENT
Start: 2022-08-05 | End: 2022-08-05 | Stop reason: SDUPTHER

## 2022-08-05 RX ORDER — OXYCODONE HYDROCHLORIDE 5 MG/1
10 TABLET ORAL PRN
Status: DISCONTINUED | OUTPATIENT
Start: 2022-08-05 | End: 2022-08-05 | Stop reason: HOSPADM

## 2022-08-05 RX ORDER — DIPHENHYDRAMINE HYDROCHLORIDE 50 MG/ML
12.5 INJECTION INTRAMUSCULAR; INTRAVENOUS
Status: DISCONTINUED | OUTPATIENT
Start: 2022-08-05 | End: 2022-08-05 | Stop reason: HOSPADM

## 2022-08-05 RX ORDER — LIDOCAINE HYDROCHLORIDE 10 MG/ML
0.3 INJECTION, SOLUTION EPIDURAL; INFILTRATION; INTRACAUDAL; PERINEURAL
Status: DISCONTINUED | OUTPATIENT
Start: 2022-08-05 | End: 2022-08-05 | Stop reason: HOSPADM

## 2022-08-05 RX ADMIN — SODIUM CHLORIDE, SODIUM LACTATE, POTASSIUM CHLORIDE, AND CALCIUM CHLORIDE: .6; .31; .03; .02 INJECTION, SOLUTION INTRAVENOUS at 06:26

## 2022-08-05 RX ADMIN — DEXAMETHASONE SODIUM PHOSPHATE 8 MG: 4 INJECTION, SOLUTION INTRAMUSCULAR; INTRAVENOUS at 07:55

## 2022-08-05 RX ADMIN — LIDOCAINE HYDROCHLORIDE 50 MG: 10 INJECTION, SOLUTION EPIDURAL; INFILTRATION; INTRACAUDAL; PERINEURAL at 07:37

## 2022-08-05 RX ADMIN — MIDAZOLAM HYDROCHLORIDE 2 MG: 2 INJECTION, SOLUTION INTRAMUSCULAR; INTRAVENOUS at 07:29

## 2022-08-05 RX ADMIN — SUGAMMADEX 200 MG: 100 INJECTION, SOLUTION INTRAVENOUS at 08:08

## 2022-08-05 RX ADMIN — FENTANYL CITRATE 75 MCG: 50 INJECTION, SOLUTION INTRAMUSCULAR; INTRAVENOUS at 07:37

## 2022-08-05 RX ADMIN — FENTANYL CITRATE 75 MCG: 50 INJECTION, SOLUTION INTRAMUSCULAR; INTRAVENOUS at 07:54

## 2022-08-05 RX ADMIN — ONDANSETRON 4 MG: 2 INJECTION INTRAMUSCULAR; INTRAVENOUS at 07:55

## 2022-08-05 RX ADMIN — CEFAZOLIN 2000 MG: 10 INJECTION, POWDER, FOR SOLUTION INTRAVENOUS at 07:34

## 2022-08-05 RX ADMIN — ROCURONIUM BROMIDE 50 MG: 10 SOLUTION INTRAVENOUS at 07:37

## 2022-08-05 RX ADMIN — PROPOFOL 200 MG: 10 INJECTION, EMULSION INTRAVENOUS at 07:37

## 2022-08-05 ASSESSMENT — PAIN - FUNCTIONAL ASSESSMENT: PAIN_FUNCTIONAL_ASSESSMENT: NONE - DENIES PAIN

## 2022-08-05 NOTE — PROGRESS NOTES
Pt arrived to PACU, oral airway in place. Report received. Pt not yet responding. VSS. Placed on 2L. This writer at bedside.  Jaci Mcmanus RN

## 2022-08-05 NOTE — H&P
I have reviewed the history and physical and examined the patient. I find no relevant changes. I have reviewed with the patient and/or family members, during the preoperative office visit the risks, benefits, and alternatives to the procedure. Thigh cyst had resolved and no plans for excision at this time.     Matt Campoverde MD

## 2022-08-05 NOTE — PROGRESS NOTES
D:Discharge instructions given to pt and family. Verbalized understanding. PIV removed. Pt dressed and wheeled out and discharged to the care of their family in stable condition.

## 2022-08-05 NOTE — PROGRESS NOTES
Oral airway removed by pt. Pt suctioned and encouraged to cough. Denies needs or discomforts at this time. Placed on 1L via NC.   Sameer Smith RN

## 2022-08-05 NOTE — TELEPHONE ENCOUNTER
Shakira from [employer] called to ask about this pt's surgery with Dr. Dirk Mir. She is faxing over the paperwork to be filled out for this pt. Junior Thomas stated the most important information needed is restrictions and limitations after surgery and out of work dates. When faxing back the paperwork, please put ATTN: Claim # [id number] on the cover sheet. If any questions or concerns, call [phone]. Fax paperwork to [phone].

## 2022-08-05 NOTE — ANESTHESIA POSTPROCEDURE EVALUATION
Department of Anesthesiology  Postprocedure Note    Patient: Segundo Chu  MRN: [id number]  YOB: 1970  Date of evaluation: 8/5/2022      Procedure Summary     Date: 08/05/22 Room / Location: [address] [place] [address]    Anesthesia Start: 0730 Anesthesia Stop: 0830    Procedure: SURGICAL REMOVAL OF PILONIDAL CYST (Coccyx) Diagnosis:       Pilonidal cyst      (PILONIDAL CYST)    Surgeons: Wili Hung MD Responsible Provider: Smiley Lara MD    Anesthesia Type: general ASA Status: 3          Anesthesia Type: No value filed.     Ryan Phase I: Ryan Score: 5    Ryan Phase II: Ryan Score: 10      Anesthesia Post Evaluation    Patient location during evaluation: PACU  Level of consciousness: awake  Airway patency: patent  Nausea & Vomiting: no nausea  Complications: no  Cardiovascular status: blood pressure returned to baseline  Respiratory status: acceptable  Hydration status: euvolemic

## 2022-08-05 NOTE — DISCHARGE INSTRUCTIONS
[facility] Francine Mcconnell AND KARENCRISTIANO Luke. Pan Hurd M.D. [address] [place] [address] [place]                [address] Mino Kevin M.D. [address] [facility], [address] [place] [address]         [place] [address] [facility] Jean-Claude Mcmillan M.D                         [phone]        [facility] Santa Gomez M.D. [address] [place] [address]     Call the office to schedule your post-operative appointment with your surgeon for two (2) weeks. Change gauze bandage daily and for drainage    You may shower. Wash incisions gently, and pat them dry. Do not rub your incisions. You will have pain medicine ordered. Take as directed. Do NOT drive while taking your narcotic pain medicine. Watch for signs of infection:       Fever over 100.5°     Excessive warmth, or bright redness around your incision    Leakage of bloody or cloudy fluid from you incisions   ANESTHESIA DISCHARGE INSTRUCTIONS    You are under the influence of drugs- do not drink alcohol, drive a car, operate machinery(such as power tools, kitchen appliances, etc), sign legal documents, or make any important decisions for 24 hours (or while on pain medications). Children should not ride bikes or [place] or play on gym sets  for 24 hours after surgery. A responsible adult should be with you for 24 hours. Rest at home today- increase activity as tolerated. Progress slowly to a regular diet unless your physician has instructed you otherwise. Drink plenty of water. CALL YOUR DOCTOR IF YOU:  Have moderate to severe nausea or vomiting AND are unable to hold down fluids or prescribed medications. Have bright red bloody drainage from your dressing that won't stop oozing.   Do not get relief with your pain medication    NORMAL (POSSIBLE) SIDE EFFECTS FROM ANESTHESIA:     Confusion, temporary memory loss, delayed reaction times in the first 24 hours  Lightheadedness, dizziness, difficulty focusing, blurred vision  Nausea/vomiting can happen  Shivering, feeling cold, sore throat, cough and muscle aches should stop within 24-48 hours  Trouble urinating - call your surgeon if it has been more than 8 hrs  Bruising or soreness at the IV site - call if it remains red, firm or there is drainage             FEMALES OF CHILDBEARING AGE WHO ARE TAKING BIRTH CONTROL PILLS:  You may have received a medication during your procedure that interferes with the   actions of birth control pills (Bridion or Emend). Use some other kind of birth control in addition to your pills, like a condom, for 1 month after your procedure to prevent unwanted pregnancy. The following instructions are to be followed if you have a known history or diagnosis of sleep apnea: For all sleep apnea patients:  ? Sleep on your side or sitting up in a chair whenever possible, especially the first 24 hours after surgery. ? Use only medicines prescribed by your doctor. ? Do not drink alcohol. ? If you have a dental device to assist you while at rest, use it at all times for the first 24 hours. For patients using CPAP machines:  ? Use your CPAP machine during all periods of sleep as usual.  ? Use your CPAP machine during all periods of daytime rest while on pain medicines. ** Follow up with your primary care doctor for continued care. IF YOU DO NOT TAKE ALL OF YOUR NARCOTIC PAIN MEDICATION, please dispose of them responsibly. There are drop off boxes in the Emergency Departments 24/7 at both [facility] and [facility]. If these locations are not convenient, other options for discarding them can be found at:  [email or web address]    Hospital or office staff may NOT accept any medications to drop off in the cabinet for you.

## 2022-08-05 NOTE — PROGRESS NOTES
Pt arrives to  [facility] alert and oriented, vss.  RN signed consent with pt and verb understanding.

## 2022-08-05 NOTE — ANESTHESIA PRE PROCEDURE
Department of Anesthesiology  Preprocedure Note       Name:  Geneva Ny   Age:  54 y.o.  :  1970                                          MRN:  [id number]         Date:  2022      Surgeon: Geno Mcgrath):  Sallie Moreno MD    Procedure: Procedure(s):  SURGICAL REMOVAL OF PILONIDAL CYST AND RIGHT THIGH CYST    Medications prior to admission:   Prior to Admission medications    Not on File       Current medications:    Current Facility-Administered Medications   Medication Dose Route Frequency Provider Last Rate Last Admin    lidocaine PF 1 % injection 0.3 mL  0.3 mL IntraDERmal Once PRN Melina Villa MD        lactated ringers infusion   IntraVENous Continuous Melina Villa MD        sodium chloride flush 0.9 % injection 5-40 mL  5-40 mL IntraVENous 2 times per day Melina Villa MD        sodium chloride flush 0.9 % injection 5-40 mL  5-40 mL IntraVENous PRN Melina Villa MD        0.9 % sodium chloride infusion   IntraVENous PRN Melina Villa MD         No current outpatient medications on file. Allergies:  No Known Allergies    Problem List:    Patient Active Problem List   Diagnosis Code    Amblyopia of right eye H53.001    Hemorrhoids K64.9    Acute pain of right knee M25.561    Polyp of colon K63.5    Hyperplastic polyp of sigmoid colon K63.5    Abscess L02.91    Obesity (BMI 30-39. 9) E66.9    Severe obstructive sleep apnea G47.33       Past Medical History:        Diagnosis Date    Achilles tendonitis     dr Ablerto Montiel of left hip     Childhood asthma     childhood- no adult sx    History of colon surgery spring 2019    resection for large polyp    Hyperlipidemia     JULIAN on CPAP     Strain of left knee     Dr Gideon Suarez       Past Surgical History:        Procedure Laterality Date    ABDOMEN SURGERY N/A 10/1/2020    ABDOMINAL WALL IRRIGATION AND DEBRIDEMENT performed by Sallie Moreno MD at [facility] [address] ARTHROSCOPY Left  Dr Jacqui Coronado  03/2019    sigmoid resection- polyposis / TICs    COLONOSCOPY N/A 12/18/2018    COLONOSCOPY POLYPECTOMY SNARE/COLD BIOPSY performed by Bhavani Mitchell MD at [facility] COLONOSCOPY N/A 12/18/2018    COLONOSCOPY WITH BIOPSY performed by Bhavani Mitchell MD at [facility]  12/18/2018    COLONOSCOPY SUBMUCOSAL/BOTOX INJECTION performed by Bhavani Mitchell MD at [address] [facility] [address],  [address] History:    Social History     Tobacco Use    Smoking status: Every Day     Packs/day: 1.00     Years: 15.00     Pack years: 15.00     Types: Cigarettes    Smokeless tobacco: Never   Substance Use Topics    Alcohol use: Yes     Alcohol/week: 0.0 standard drinks     Comment: occas. Ready to quit: Not Answered  Counseling given: Not Answered      Vital Signs (Current):   Vitals:    07/27/22 1445   Weight: 215 lb (97.5 kg)   Height: 5' 10\" (1.778 m)                                              BP Readings from Last 3 Encounters:   07/14/22 120/80   06/09/22 132/83   05/05/22 130/78       NPO Status:                                                                                 BMI:   Wt Readings from Last 3 Encounters:   07/14/22 213 lb (96.6 kg)   06/09/22 213 lb (96.6 kg)   05/05/22 209 lb (94.8 kg)     Body mass index is 30.85 kg/m².     CBC:   Lab Results   Component Value Date/Time    WBC 7.7 11/17/2021 09:56 AM    RBC 4.95 11/17/2021 09:56 AM    HGB 16.1 11/17/2021 09:56 AM    HCT 48.1 11/17/2021 09:56 AM    MCV 97.2 11/17/2021 09:56 AM    RDW 14.5 11/17/2021 09:56 AM     11/17/2021 09:56 AM       CMP:   Lab Results   Component Value Date/Time     11/17/2021 09:56 AM    K 4.8 11/17/2021 09:56 AM    K 5.3 10/01/2020 12:08 PM    CL 99 11/17/2021 09:56 AM    CO2 22 11/17/2021 09:56 AM    BUN 7 11/17/2021 09:56 AM    CREATININE 1.0 11/17/2021 09:56 AM    GFRAA >60 11/17/2021 09:56 AM    AGRATIO 1.6 11/17/2021 09:56 AM    LABGLOM >60 11/17/2021 09:56 AM    GLUCOSE 130 11/17/2021 09:56 AM    PROT 7.5 11/17/2021 09:56 AM    CALCIUM 9.7 11/17/2021 09:56 AM    BILITOT 0.4 11/17/2021 09:56 AM    ALKPHOS 58 11/17/2021 09:56 AM    AST 21 11/17/2021 09:56 AM    ALT 26 11/17/2021 09:56 AM       POC Tests: No results for input(s): POCGLU, POCNA, POCK, POCCL, POCBUN, POCHEMO, POCHCT in the last 72 hours. Coags: No results found for: PROTIME, INR, APTT    HCG (If Applicable): No results found for: PREGTESTUR, PREGSERUM, HCG, HCGQUANT     ABGs: No results found for: PHART, PO2ART, IJC0YVR, AUK7NXL, BEART, V9XPCXJQ     Type & Screen (If Applicable):  No results found for: LABABO, LABRH    Drug/Infectious Status (If Applicable):  No results found for: HIV, HEPCAB    COVID-19 Screening (If Applicable): No results found for: COVID19        Anesthesia Evaluation  Patient summary reviewed and Nursing notes reviewed no history of anesthetic complications:   Airway: Mallampati: II  TM distance: >3 FB   Neck ROM: full  Mouth opening: > = 3 FB   Dental:          Pulmonary:   (+) sleep apnea:  asthma:                            Cardiovascular:Negative CV ROS                      Neuro/Psych:   Negative Neuro/Psych ROS              GI/Hepatic/Renal: Neg GI/Hepatic/Renal ROS       (-) GERD, liver disease and no renal disease       Endo/Other: Negative Endo/Other ROS       (-) diabetes mellitus               Abdominal:             Vascular: negative vascular ROS. Other Findings:           Anesthesia Plan      general     ASA 3     (I discussed with the patient the risks and benefits of PIV, general anesthesia, IV Narcotics, PACU. All questions were answered the patient agrees with the plan)  Induction: intravenous. MIPS: Prophylactic antiemetics administered. Anesthetic plan and risks discussed with patient. Plan discussed with CRNA.                     Kaity Nowak MD 8/5/2022

## 2022-08-05 NOTE — OP NOTE
Date of Surgery: 8/5/22    Preop Dx:  Pilonidal Cyst    Postop Dx:  Same    Procedure:  Pilonidal Cystectomy       Surgeon:  Vincent Koch    Assistant:      Anesthesia:  GETA    EBL:   <50ml    Specimen:  Pilonidal cyst    Complications:  none    Drains/Lines:  none    Indications:  53 yo with pilonidal cyst with intermittent drainage. Description:  Patient was given adequate description of the risks and rewards of the procedure, including bleeding, infection, wound not healing completely, recurrence and freely consented. He was given appropriate antibiotics and brought to the OR where GETA anesthesia was induced. He was placed in prone position. Prepped and draped in usual sterile fashion. Incision was made with a #15 blade in an ovoid fashion to include all of the pilonidal cyst tissue. This was carried down to the fascial level with electrocautery. The tissue was then excised and sent as specimen. The cavity was inspected and made hemostatic with electrocautery. It was then irrigated with normal saline. Initial closure was with 3-0 vicryl interrupted suture incorporating the deep subcutaneous tissue and a portion of the presacral fascia. 3-0 nylon mattress suture reapproximated the epidermis. Sterile dressing placed. All suture, sponge and instrument count correct times two at end of case. Transferred to PACU in stable condition.     Tiana De Los Santos MD

## 2022-08-08 NOTE — TELEPHONE ENCOUNTER
[facility] paperwork faxed. The patient has been notified of this information and all questions answered.

## 2022-08-18 ENCOUNTER — OFFICE VISIT (OUTPATIENT)
Dept: SURGERY | Age: 52
End: 2022-08-18

## 2022-08-18 VITALS
DIASTOLIC BLOOD PRESSURE: 80 MMHG | HEART RATE: 84 BPM | SYSTOLIC BLOOD PRESSURE: 128 MMHG | BODY MASS INDEX: 30.78 KG/M2 | HEIGHT: 70 IN | WEIGHT: 215 LBS | TEMPERATURE: 97.7 F

## 2022-08-18 DIAGNOSIS — Z09 POSTOP CHECK: Primary | ICD-10-CM

## 2022-08-18 PROCEDURE — 99024 POSTOP FOLLOW-UP VISIT: CPT | Performed by: SURGERY

## 2022-08-18 NOTE — PROGRESS NOTES
[facility]    Surgery Progress Note           PATIENT NAME: Oscar Harper     TODAY'S DATE: 8/18/2022    SUBJECTIVE:    Pt  is here for post op check s/p pilonidal cyst excision. Reports drainage has improved over the last week and almost fully resolved. Denies pain, fever, chills. OBJECTIVE:   VITALS:  /80 (Site: Right Lower Arm, Position: Sitting)   Pulse 84   Temp 97.7 °F (36.5 °C)   Ht 5' 10\" (1.778 m)   Wt 215 lb (97.5 kg)   BMI 30.85 kg/m²                 CONSTITUTIONAL:  awake and alert  LUNGS:  clear to auscultation  ABDOMEN:    soft, non-distended, non-tender   INCISION: clean, dry, healing, very minimal drainage noted at inferior suture      Radiology Review:  n/a    ASSESSMENT AND PLAN:  54 y.o. male status post pilonidal cyst excision  -incisions are healing well, very minimal drainage from inferior site, patient counseled to apply superficial gauze  -sutures removed today  -follow up as needed    Carlton Mojica MD     Patient seen and agree with above and more than half of the total time was spent by me on the encounter.      Oralia Fontaine MD

## 2022-08-19 ENCOUNTER — TELEPHONE (OUTPATIENT)
Dept: SURGERY | Age: 52
End: 2022-08-19

## 2022-08-19 NOTE — TELEPHONE ENCOUNTER
Spoke with the patient, he is returning to work on 8/22/22 with no restrictions as he sits at a desk and is healing as expected. Will call the office if he has any questions.

## 2022-11-21 ENCOUNTER — OFFICE VISIT (OUTPATIENT)
Dept: FAMILY MEDICINE CLINIC | Age: 52
End: 2022-11-21
Payer: COMMERCIAL

## 2022-11-21 VITALS
RESPIRATION RATE: 16 BRPM | WEIGHT: 229 LBS | DIASTOLIC BLOOD PRESSURE: 84 MMHG | TEMPERATURE: 97.1 F | SYSTOLIC BLOOD PRESSURE: 136 MMHG | HEIGHT: 70 IN | HEART RATE: 91 BPM | BODY MASS INDEX: 32.78 KG/M2 | OXYGEN SATURATION: 98 %

## 2022-11-21 DIAGNOSIS — Z00.00 ANNUAL PHYSICAL EXAM: ICD-10-CM

## 2022-11-21 DIAGNOSIS — Z23 NEEDS FLU SHOT: ICD-10-CM

## 2022-11-21 DIAGNOSIS — Z00.00 ANNUAL PHYSICAL EXAM: Primary | ICD-10-CM

## 2022-11-21 LAB
A/G RATIO: 1.5 (ref 1.1–2.2)
ALBUMIN SERPL-MCNC: 4.8 G/DL (ref 3.4–5)
ALP BLD-CCNC: 65 U/L (ref 40–129)
ALT SERPL-CCNC: 22 U/L (ref 10–40)
ANION GAP SERPL CALCULATED.3IONS-SCNC: 13 MMOL/L (ref 3–16)
AST SERPL-CCNC: 22 U/L (ref 15–37)
BASOPHILS ABSOLUTE: 0.1 K/UL (ref 0–0.2)
BASOPHILS RELATIVE PERCENT: 1 %
BILIRUB SERPL-MCNC: 0.6 MG/DL (ref 0–1)
BUN BLDV-MCNC: 10 MG/DL (ref 7–20)
CALCIUM SERPL-MCNC: 10.5 MG/DL (ref 8.3–10.6)
CHLORIDE BLD-SCNC: 101 MMOL/L (ref 99–110)
CHOLESTEROL, TOTAL: 349 MG/DL (ref 0–199)
CO2: 27 MMOL/L (ref 21–32)
CREAT SERPL-MCNC: 1 MG/DL (ref 0.9–1.3)
EOSINOPHILS ABSOLUTE: 0.3 K/UL (ref 0–0.6)
EOSINOPHILS RELATIVE PERCENT: 3 %
GFR SERPL CREATININE-BSD FRML MDRD: >60 ML/MIN/{1.73_M2}
GLUCOSE BLD-MCNC: 100 MG/DL (ref 70–99)
HCT VFR BLD CALC: 52.1 % (ref 40.5–52.5)
HDLC SERPL-MCNC: 40 MG/DL (ref 40–60)
HEMOGLOBIN: 17.5 G/DL (ref 13.5–17.5)
LDL CHOLESTEROL CALCULATED: ABNORMAL MG/DL
LDL CHOLESTEROL DIRECT: 251 MG/DL
LYMPHOCYTES ABSOLUTE: 3 K/UL (ref 1–5.1)
LYMPHOCYTES RELATIVE PERCENT: 33 %
MCH RBC QN AUTO: 33 PG (ref 26–34)
MCHC RBC AUTO-ENTMCNC: 33.5 G/DL (ref 31–36)
MCV RBC AUTO: 98.5 FL (ref 80–100)
MONOCYTES ABSOLUTE: 0.5 K/UL (ref 0–1.3)
MONOCYTES RELATIVE PERCENT: 5 %
NEUTROPHILS ABSOLUTE: 5.3 K/UL (ref 1.7–7.7)
NEUTROPHILS RELATIVE PERCENT: 58 %
PDW BLD-RTO: 14.4 % (ref 12.4–15.4)
PLATELET # BLD: 287 K/UL (ref 135–450)
PLATELET SLIDE REVIEW: ADEQUATE
PMV BLD AUTO: 9 FL (ref 5–10.5)
POTASSIUM SERPL-SCNC: 5.3 MMOL/L (ref 3.5–5.1)
RBC # BLD: 5.29 M/UL (ref 4.2–5.9)
RBC # BLD: NORMAL 10*6/UL
SLIDE REVIEW: NORMAL
SODIUM BLD-SCNC: 141 MMOL/L (ref 136–145)
TOTAL PROTEIN: 7.9 G/DL (ref 6.4–8.2)
TRIGL SERPL-MCNC: 320 MG/DL (ref 0–150)
VLDLC SERPL CALC-MCNC: ABNORMAL MG/DL
WBC # BLD: 9.1 K/UL (ref 4–11)

## 2022-11-21 PROCEDURE — 90674 CCIIV4 VAC NO PRSV 0.5 ML IM: CPT | Performed by: FAMILY MEDICINE

## 2022-11-21 PROCEDURE — 90471 IMMUNIZATION ADMIN: CPT | Performed by: FAMILY MEDICINE

## 2022-11-21 PROCEDURE — 99396 PREV VISIT EST AGE 40-64: CPT | Performed by: FAMILY MEDICINE

## 2022-11-21 ASSESSMENT — PATIENT HEALTH QUESTIONNAIRE - PHQ9
1. LITTLE INTEREST OR PLEASURE IN DOING THINGS: 0
SUM OF ALL RESPONSES TO PHQ QUESTIONS 1-9: 0
2. FEELING DOWN, DEPRESSED OR HOPELESS: 0
SUM OF ALL RESPONSES TO PHQ QUESTIONS 1-9: 0
SUM OF ALL RESPONSES TO PHQ9 QUESTIONS 1 & 2: 0

## 2022-11-21 ASSESSMENT — ENCOUNTER SYMPTOMS
GASTROINTESTINAL NEGATIVE: 1
RESPIRATORY NEGATIVE: 1

## 2022-11-21 NOTE — PROGRESS NOTES
Alcohol use: Yes     Alcohol/week: 0.0 standard drinks     Comment: occas. Drug use: No    Sexual activity: Not Currently   Other Topics Concern    Not on file   Social History Narrative    Not on file     Social Determinants of Health     Financial Resource Strain: Low Risk     Difficulty of Paying Living Expenses: Not hard at all   Food Insecurity: No Food Insecurity    Worried About Running Out of Food in the Last Year: Never true    [address] in the Last Year: Never true   Transportation Needs: No Transportation Needs    Lack of Transportation (Medical): No    Lack of Transportation (Non-Medical): No   Physical Activity: Not on file   Stress: Not on file   Social Connections: Not on file   Intimate Partner Violence: Not on file   Housing Stability: Unknown    Unable to Pay for Housing in the Last Year: No    Number of Places Lived in the Last Year: Not on file    Unstable Housing in the Last Year: No       Family History   Problem Relation Age of Onset    Cancer Mother         lung cancer-   2019    Diabetes Neg Hx     Heart Disease Neg Hx        Vitals:    22 1011   BP: 136/84   Pulse: 91   Resp: 16   Temp: 97.1 °F (36.2 °C)   SpO2: 98%       Wt Readings from Last 3 Encounters:   22 229 lb (103.9 kg)   22 215 lb (97.5 kg)   22 215 lb (97.5 kg)       Review of Systems   Constitutional:  Positive for unexpected weight change. Negative for activity change. Is mostly sedentary-   discussed light activity / walking even   HENT: Negative. Respiratory: Negative. + smoker -  1 ppd. Discussed cessation, nicotine and habit   Cardiovascular: Negative. Gastrointestinal: Negative. Musculoskeletal: Negative. Neurological: Negative. Psychiatric/Behavioral: Negative. Objective:   Physical Exam  Constitutional:       Appearance: He is obese. He is not ill-appearing.    HENT:      Right Ear: Tympanic membrane normal.      Left Ear: Tympanic membrane normal.   Eyes:      General: No scleral icterus. Comments: Right eye lateral strabismus    Some proptosis    Cardiovascular:      Rate and Rhythm: Normal rate and regular rhythm. Heart sounds: Normal heart sounds. Pulmonary:      Effort: Pulmonary effort is normal.      Breath sounds: Normal breath sounds. Abdominal:      General: Abdomen is flat. There is no distension. Palpations: Abdomen is soft. There is no mass. Musculoskeletal:      Right lower leg: No edema. Left lower leg: No edema. Lymphadenopathy:      Cervical: No cervical adenopathy. Skin:     General: Skin is warm and dry. Neurological:      General: No focal deficit present. Mental Status: He is alert and oriented to person, place, and time. Psychiatric:         Mood and Affect: Mood normal.         Behavior: Behavior normal.       Assessment:      Annual physical  Tobacco use. Amblyopia right eye      Plan:     Smoking cessation discussed,  strategies and assistance discussed  Yearly labs today  lipids, cmp, cbc,    Flu vaccine  General health maintenance and health screening and preventive care discussed. No current outpatient medications on file. No current facility-administered medications for this visit.

## 2022-11-21 NOTE — PATIENT INSTRUCTIONS
Check on the lab results in 2-3 days if we have not called.     Work on smoking less    Try to start walking or exercising,  get some  pounds off

## 2023-04-19 ENCOUNTER — PATIENT MESSAGE (OUTPATIENT)
Dept: FAMILY MEDICINE CLINIC | Age: 53
End: 2023-04-19

## 2023-05-09 ENCOUNTER — TELEMEDICINE (OUTPATIENT)
Dept: SLEEP MEDICINE | Age: 53
End: 2023-05-09
Payer: COMMERCIAL

## 2023-05-09 DIAGNOSIS — G47.33 MODERATE OBSTRUCTIVE SLEEP APNEA: Primary | ICD-10-CM

## 2023-05-09 DIAGNOSIS — E66.9 OBESITY (BMI 30-39.9): ICD-10-CM

## 2023-05-09 DIAGNOSIS — Z71.89 CPAP USE COUNSELING: ICD-10-CM

## 2023-05-09 PROCEDURE — 99213 OFFICE O/P EST LOW 20 MIN: CPT | Performed by: NURSE PRACTITIONER

## 2023-05-09 ASSESSMENT — SLEEP AND FATIGUE QUESTIONNAIRES
HOW LIKELY ARE YOU TO NOD OFF OR FALL ASLEEP WHEN YOU ARE A PASSENGER IN A CAR FOR AN HOUR WITHOUT A BREAK: 0
HOW LIKELY ARE YOU TO NOD OFF OR FALL ASLEEP WHILE SITTING INACTIVE IN A PUBLIC PLACE: 0
HOW LIKELY ARE YOU TO NOD OFF OR FALL ASLEEP WHILE LYING DOWN TO REST IN THE AFTERNOON WHEN CIRCUMSTANCES PERMIT: 1
HOW LIKELY ARE YOU TO NOD OFF OR FALL ASLEEP WHILE WATCHING TV: 0
HOW LIKELY ARE YOU TO NOD OFF OR FALL ASLEEP WHILE SITTING AND TALKING TO SOMEONE: 0
HOW LIKELY ARE YOU TO NOD OFF OR FALL ASLEEP IN A CAR, WHILE STOPPED FOR A FEW MINUTES IN TRAFFIC: 0
ESS TOTAL SCORE: 3
HOW LIKELY ARE YOU TO NOD OFF OR FALL ASLEEP WHILE SITTING QUIETLY AFTER LUNCH WITHOUT ALCOHOL: 1
HOW LIKELY ARE YOU TO NOD OFF OR FALL ASLEEP WHILE SITTING AND READING: 1

## 2023-05-09 NOTE — PROGRESS NOTES
started auto CPAP 8-16 cm H2O  ONPO from 5/28/2022 on auto CPAP 8-16 cm H2O reviewed. Showed under 88% for 16 seconds. CPAP download data:  Compliance download report from 4/5/22 to 5/4/22 reviewed today by me and showed patient is using machine 5:22 hrs/night with 87% compliance and AHI 0.1 within this time frame. 26/30days with greater than 4 hours of machine use. 90% pressure 12.7 cm H20 on auto CPAP 8-16 cm H2O     Compliance download report from 4/7/23 to 5/4/23 reviewed today by me and showed patient is using machine 5:54 hrs/night with 47% compliance and AHI 0.1 within this time frame. 14/30days with greater than 4 hours of machine use. 90% pressure 12.7 cm H20 on auto CPAP 8-16 cm H2O    Assessment:       Moderate JULIAN. Auto CPAP 8-16 cm H2O. Suboptimal compliance on review today  Snoring-resolved on CPAP  Observed sleep apnea -resolved on CPAP  Fatigue-improved with CPAP use  Obesity        Plan:      Continue auto CPAP 8-16 cm H2O  Interpreted and reviewed CPAP download data with patient  Discussed severity of sleep apnea and importance of CPAP use/treatment  Can try different mask for comfort/fit if needed. Patient to call DME if he would like to try different mask  Advised to use CPAP 6-8 hrs at night and during naps-need to increase use  Discussed CPAP acclamation techniques. Replacement of mask, tubing, head straps every 3-6 months or sooner if damaged. Patient instructed to contact DME company for any mask, tubing or machine trouble shooting if problems arise. Sleep hygiene  Avoidsedatives, alcohol and caffeinated drinks at bed time. No driving motorized vehicles or operating heavy machinery while fatigue, drowsy or sleepy. Weight loss is also recommended as a long-term intervention.     Complications of JULIAN if not treated were discussed with patient patient to include systemic hypertension, pulmonary hypertension, cardiovascular morbidities, car accidents and all cause

## 2023-05-11 ENCOUNTER — OFFICE VISIT (OUTPATIENT)
Dept: FAMILY MEDICINE CLINIC | Age: 53
End: 2023-05-11
Payer: COMMERCIAL

## 2023-05-11 VITALS
DIASTOLIC BLOOD PRESSURE: 82 MMHG | WEIGHT: 234 LBS | RESPIRATION RATE: 16 BRPM | HEART RATE: 107 BPM | OXYGEN SATURATION: 95 % | TEMPERATURE: 97.2 F | SYSTOLIC BLOOD PRESSURE: 134 MMHG | BODY MASS INDEX: 33.58 KG/M2

## 2023-05-11 DIAGNOSIS — L05.91 PILONIDAL CYST: Primary | ICD-10-CM

## 2023-05-11 DIAGNOSIS — T81.89XS DELAYED SURGICAL WOUND HEALING, SEQUELA: ICD-10-CM

## 2023-05-11 PROCEDURE — 99213 OFFICE O/P EST LOW 20 MIN: CPT | Performed by: FAMILY MEDICINE

## 2023-05-11 SDOH — ECONOMIC STABILITY: FOOD INSECURITY: WITHIN THE PAST 12 MONTHS, THE FOOD YOU BOUGHT JUST DIDN'T LAST AND YOU DIDN'T HAVE MONEY TO GET MORE.: NEVER TRUE

## 2023-05-11 SDOH — ECONOMIC STABILITY: INCOME INSECURITY: HOW HARD IS IT FOR YOU TO PAY FOR THE VERY BASICS LIKE FOOD, HOUSING, MEDICAL CARE, AND HEATING?: NOT HARD AT ALL

## 2023-05-11 SDOH — ECONOMIC STABILITY: FOOD INSECURITY: WITHIN THE PAST 12 MONTHS, YOU WORRIED THAT YOUR FOOD WOULD RUN OUT BEFORE YOU GOT MONEY TO BUY MORE.: NEVER TRUE

## 2023-05-11 ASSESSMENT — PATIENT HEALTH QUESTIONNAIRE - PHQ9
SUM OF ALL RESPONSES TO PHQ QUESTIONS 1-9: 0
SUM OF ALL RESPONSES TO PHQ QUESTIONS 1-9: 0
SUM OF ALL RESPONSES TO PHQ9 QUESTIONS 1 & 2: 0
2. FEELING DOWN, DEPRESSED OR HOPELESS: 0
1. LITTLE INTEREST OR PLEASURE IN DOING THINGS: 0
SUM OF ALL RESPONSES TO PHQ QUESTIONS 1-9: 0
SUM OF ALL RESPONSES TO PHQ QUESTIONS 1-9: 0

## 2023-05-11 NOTE — PROGRESS NOTES
Subjective:      Patient ID: Khari Kaba is a 50 y.o. y.o. male. Had surgery on cyst September    Was seemingly healed. Later started to have some issues. Not sure incision is totally healed.     Is leaking / oozing some  HPI      Chief Complaint   Patient presents with    Follow-up     Cyst removed on buttocks 9/2022 - incision has never fully healed       No Known Allergies    Past Medical History:   Diagnosis Date    Achilles tendonitis 2011    dr Jennifer Stinson of left hip 2011    Childhood asthma     childhood- no adult sx    History of colon surgery spring 2019    resection for large polyp    Hyperlipidemia     JLUIAN on CPAP     Strain of left knee     Dr Rendell Bernheim       Past Surgical History:   Procedure Laterality Date    ABDOMEN SURGERY N/A 10/1/2020    ABDOMINAL WALL IRRIGATION AND DEBRIDEMENT performed by Kim Atkinson MD at [address] ARTHROSCOPY Left 2012    Dr Chong Osuna  03/2019    sigmoid resection- polyposis / TICs    COLONOSCOPY N/A 12/18/2018    COLONOSCOPY POLYPECTOMY SNARE/COLD BIOPSY performed by Silverio Nieves MD at [facility] N/A 12/18/2018    COLONOSCOPY WITH BIOPSY performed by Silverio Nieves MD at [address] [facility] N/A 8/5/2022    SURGICAL REMOVAL OF PILONIDAL CYST performed by Kim Atkinson MD at [address] [facility]  12/18/2018    COLONOSCOPY SUBMUCOSAL/BOTOX INJECTION performed by Silverio Nieves MD at [facility] History     Socioeconomic History    Marital status:      Spouse name: Not on file    Number of children: Not on file    Years of education: Not on file    Highest education level: Not on file   Occupational History    Occupation: bank    Tobacco Use    Smoking status: Every Day     Packs/day: 1.00     Years: 15.00     Pack years: 15.00     Types: Cigarettes     Passive exposure:

## 2023-05-11 NOTE — PATIENT INSTRUCTIONS
Gently clean  Pat dry, do not rub    See the response 2 weeks.     If not closed, if still oozing 2 weeks let me know

## 2023-06-22 ENCOUNTER — OFFICE VISIT (OUTPATIENT)
Dept: FAMILY MEDICINE CLINIC | Age: 53
End: 2023-06-22
Payer: COMMERCIAL

## 2023-06-22 VITALS
HEART RATE: 113 BPM | DIASTOLIC BLOOD PRESSURE: 74 MMHG | SYSTOLIC BLOOD PRESSURE: 136 MMHG | WEIGHT: 231 LBS | TEMPERATURE: 97.2 F | BODY MASS INDEX: 33.15 KG/M2 | RESPIRATION RATE: 16 BRPM | OXYGEN SATURATION: 97 %

## 2023-06-22 DIAGNOSIS — L05.91 PILONIDAL CYST: Primary | ICD-10-CM

## 2023-06-22 PROCEDURE — 99213 OFFICE O/P EST LOW 20 MIN: CPT | Performed by: FAMILY MEDICINE

## 2023-06-22 RX ORDER — AMOXICILLIN AND CLAVULANATE POTASSIUM 500; 125 MG/1; MG/1
1 TABLET, FILM COATED ORAL 3 TIMES DAILY
Qty: 30 TABLET | Refills: 0 | Status: SHIPPED | OUTPATIENT
Start: 2023-06-22

## 2023-06-22 SDOH — ECONOMIC STABILITY: FOOD INSECURITY: WITHIN THE PAST 12 MONTHS, YOU WORRIED THAT YOUR FOOD WOULD RUN OUT BEFORE YOU GOT MONEY TO BUY MORE.: NEVER TRUE

## 2023-06-22 SDOH — ECONOMIC STABILITY: FOOD INSECURITY: WITHIN THE PAST 12 MONTHS, THE FOOD YOU BOUGHT JUST DIDN'T LAST AND YOU DIDN'T HAVE MONEY TO GET MORE.: NEVER TRUE

## 2023-06-22 SDOH — ECONOMIC STABILITY: INCOME INSECURITY: HOW HARD IS IT FOR YOU TO PAY FOR THE VERY BASICS LIKE FOOD, HOUSING, MEDICAL CARE, AND HEATING?: NOT HARD AT ALL

## 2023-06-22 ASSESSMENT — PATIENT HEALTH QUESTIONNAIRE - PHQ9
SUM OF ALL RESPONSES TO PHQ QUESTIONS 1-9: 0
1. LITTLE INTEREST OR PLEASURE IN DOING THINGS: 0
SUM OF ALL RESPONSES TO PHQ QUESTIONS 1-9: 0
SUM OF ALL RESPONSES TO PHQ9 QUESTIONS 1 & 2: 0
SUM OF ALL RESPONSES TO PHQ QUESTIONS 1-9: 0
SUM OF ALL RESPONSES TO PHQ QUESTIONS 1-9: 0
2. FEELING DOWN, DEPRESSED OR HOPELESS: 0

## 2023-06-22 NOTE — PROGRESS NOTES
Subjective:      Patient ID: Ermelinda Winter is a 50 y.o. y.o. male. Has pain and swelling in the pilonidal area-  again. Had been a lot better,  then boom. Cyst  Pertinent negatives include no chills or fever.        Chief Complaint   Patient presents with    Cyst     Complications from surgery on cyst 8/5/23 - swelling & drainage - painful & hard to sleep       No Known Allergies    Past Medical History:   Diagnosis Date    Achilles tendonitis 2011    dr Bran Liu of left hip 2011    Childhood asthma     childhood- no adult sx    History of colon surgery spring 2019    resection for large polyp    Hyperlipidemia     JULIAN on CPAP     Strain of left knee     Dr Chauhan Cea       Past Surgical History:   Procedure Laterality Date    ABDOMEN SURGERY N/A 10/1/2020    ABDOMINAL WALL IRRIGATION AND DEBRIDEMENT performed by Ashley Yu MD at [address] ARTHROSCOPY Left 2012    Dr Bran Quiles  03/2019    sigmoid resection- polyposis / TICs    COLONOSCOPY N/A 12/18/2018    COLONOSCOPY POLYPECTOMY SNARE/COLD BIOPSY performed by Emely Ramsey MD at [place] [address] N/A 12/18/2018    COLONOSCOPY WITH BIOPSY performed by Emely Ramsey MD at [address] [facility] [address] N/A 8/5/2022    SURGICAL REMOVAL OF PILONIDAL CYST performed by Ashley Yu MD at [address]  12/18/2018    COLONOSCOPY SUBMUCOSAL/BOTOX INJECTION performed by Emely Ramsey MD at [facility]. [facility] History     Socioeconomic History    Marital status:      Spouse name: Not on file    Number of children: Not on file    Years of education: Not on file    Highest education level: Not on file   Occupational History    Occupation: bank    Tobacco Use    Smoking status: Every Day     Packs/day: 1.00     Years: 15.00     Pack years: 15.00     Types: Cigarettes     Passive exposure: Past

## 2023-06-22 NOTE — PATIENT INSTRUCTIONS
Usewarm soapy compresses- 10 minutes every 2 hours  Do not squeeze or try to open with needle    Take the antibiotics    Follow 1 week-

## 2023-06-29 ENCOUNTER — OFFICE VISIT (OUTPATIENT)
Dept: FAMILY MEDICINE CLINIC | Age: 53
End: 2023-06-29
Payer: COMMERCIAL

## 2023-06-29 VITALS
RESPIRATION RATE: 16 BRPM | DIASTOLIC BLOOD PRESSURE: 96 MMHG | TEMPERATURE: 96.9 F | WEIGHT: 231 LBS | SYSTOLIC BLOOD PRESSURE: 154 MMHG | OXYGEN SATURATION: 95 % | HEART RATE: 94 BPM | BODY MASS INDEX: 33.15 KG/M2

## 2023-06-29 DIAGNOSIS — L05.91 PILONIDAL CYST: Primary | ICD-10-CM

## 2023-06-29 DIAGNOSIS — M76.60 ACHILLES TENDINITIS, UNSPECIFIED LATERALITY: ICD-10-CM

## 2023-06-29 PROCEDURE — 99213 OFFICE O/P EST LOW 20 MIN: CPT | Performed by: FAMILY MEDICINE

## 2023-06-29 RX ORDER — AMOXICILLIN AND CLAVULANATE POTASSIUM 875; 125 MG/1; MG/1
1 TABLET, FILM COATED ORAL DAILY
Qty: 30 TABLET | Refills: 0 | Status: SHIPPED | OUTPATIENT
Start: 2023-06-29

## 2023-06-29 RX ORDER — METHYLPREDNISOLONE 4 MG/1
TABLET ORAL
Qty: 1 KIT | Refills: 0 | Status: SHIPPED | OUTPATIENT
Start: 2023-06-29 | End: 2023-07-05

## 2023-07-18 DIAGNOSIS — M76.60 ACHILLES TENDINITIS, UNSPECIFIED LATERALITY: Primary | ICD-10-CM

## 2023-07-31 RX ORDER — AMOXICILLIN AND CLAVULANATE POTASSIUM 875; 125 MG/1; MG/1
1 TABLET, FILM COATED ORAL DAILY
Qty: 30 TABLET | Refills: 0 | Status: SHIPPED | OUTPATIENT
Start: 2023-07-31 | End: 2023-09-06 | Stop reason: ALTCHOICE

## 2023-09-06 ENCOUNTER — OFFICE VISIT (OUTPATIENT)
Dept: FAMILY MEDICINE CLINIC | Age: 53
End: 2023-09-06
Payer: COMMERCIAL

## 2023-09-06 VITALS
RESPIRATION RATE: 16 BRPM | TEMPERATURE: 97.2 F | SYSTOLIC BLOOD PRESSURE: 138 MMHG | OXYGEN SATURATION: 99 % | HEIGHT: 70 IN | HEART RATE: 88 BPM | DIASTOLIC BLOOD PRESSURE: 88 MMHG | WEIGHT: 235 LBS | BODY MASS INDEX: 33.64 KG/M2

## 2023-09-06 DIAGNOSIS — Z23 NEED FOR TDAP VACCINATION: ICD-10-CM

## 2023-09-06 DIAGNOSIS — Z00.00 ANNUAL PHYSICAL EXAM: Primary | ICD-10-CM

## 2023-09-06 DIAGNOSIS — Z00.00 ANNUAL PHYSICAL EXAM: ICD-10-CM

## 2023-09-06 PROCEDURE — 90715 TDAP VACCINE 7 YRS/> IM: CPT | Performed by: FAMILY MEDICINE

## 2023-09-06 PROCEDURE — 99396 PREV VISIT EST AGE 40-64: CPT | Performed by: FAMILY MEDICINE

## 2023-09-06 PROCEDURE — 90471 IMMUNIZATION ADMIN: CPT | Performed by: FAMILY MEDICINE

## 2023-09-06 SDOH — ECONOMIC STABILITY: FOOD INSECURITY: WITHIN THE PAST 12 MONTHS, YOU WORRIED THAT YOUR FOOD WOULD RUN OUT BEFORE YOU GOT MONEY TO BUY MORE.: NEVER TRUE

## 2023-09-06 SDOH — ECONOMIC STABILITY: INCOME INSECURITY: HOW HARD IS IT FOR YOU TO PAY FOR THE VERY BASICS LIKE FOOD, HOUSING, MEDICAL CARE, AND HEATING?: NOT HARD AT ALL

## 2023-09-06 SDOH — ECONOMIC STABILITY: FOOD INSECURITY: WITHIN THE PAST 12 MONTHS, THE FOOD YOU BOUGHT JUST DIDN'T LAST AND YOU DIDN'T HAVE MONEY TO GET MORE.: NEVER TRUE

## 2023-09-06 ASSESSMENT — PATIENT HEALTH QUESTIONNAIRE - PHQ9
1. LITTLE INTEREST OR PLEASURE IN DOING THINGS: 0
SUM OF ALL RESPONSES TO PHQ9 QUESTIONS 1 & 2: 0
SUM OF ALL RESPONSES TO PHQ QUESTIONS 1-9: 0
2. FEELING DOWN, DEPRESSED OR HOPELESS: 0
SUM OF ALL RESPONSES TO PHQ QUESTIONS 1-9: 0

## 2023-09-06 ASSESSMENT — ENCOUNTER SYMPTOMS
BLOOD IN STOOL: 0
ABDOMINAL PAIN: 0
CONSTIPATION: 0
BACK PAIN: 0
COUGH: 0
TROUBLE SWALLOWING: 0
WHEEZING: 0
DIARRHEA: 0
SHORTNESS OF BREATH: 0

## 2023-09-06 NOTE — PROGRESS NOTES
Genitourinary:  Negative for dysuria, frequency and urgency. Musculoskeletal:  Negative for arthralgias, back pain and neck stiffness. Neurological:  Negative for seizures, numbness and headaches. There are no focal or lateralizing CNS / TIA like symptoms. Psychiatric/Behavioral:  Negative for self-injury, sleep disturbance and suicidal ideas. Objective:   Physical Exam  Constitutional:       Appearance: He is not ill-appearing. HENT:      Right Ear: Tympanic membrane normal.      Left Ear: Tympanic membrane normal.      Nose: Nose normal.      Mouth/Throat:      Mouth: Mucous membranes are moist.      Pharynx: Oropharynx is clear. Eyes:      General: No scleral icterus. Comments: Lazy eye right-   Cardiovascular:      Rate and Rhythm: Normal rate and regular rhythm. Pulses: Normal pulses. Heart sounds: Murmur heard. Pulmonary:      Effort: Pulmonary effort is normal.      Breath sounds: Normal breath sounds. Abdominal:      General: Bowel sounds are normal. There is no distension. Palpations: There is no mass. Tenderness: There is no right CVA tenderness or left CVA tenderness. Musculoskeletal:      Right lower leg: No edema. Left lower leg: No edema. Lymphadenopathy:      Cervical: No cervical adenopathy. Skin:     General: Skin is warm and dry. Comments: Pilonidal area a bit puffy,  no obvious pocket or drainage. Neurological:      Mental Status: He is alert and oriented to person, place, and time. Psychiatric:         Mood and Affect: Mood normal.         Behavior: Behavior normal.         Thought Content: Thought content normal.       Assessment:       Diagnosis Orders   1. Need for Tdap vaccination        Annual physical        Plan:     Pilonidal area discussed. ? See the surgeon if starts to drain so they can see it at it's best    Will advise after the labs.   General discussion of health maintenance, wellness, preventative issues,

## 2023-09-07 LAB
ALBUMIN SERPL-MCNC: 4.4 G/DL (ref 3.4–5)
ALBUMIN/GLOB SERPL: 1.6 {RATIO} (ref 1.1–2.2)
ALP SERPL-CCNC: 59 U/L (ref 40–129)
ALT SERPL-CCNC: 19 U/L (ref 10–40)
ANION GAP SERPL CALCULATED.3IONS-SCNC: 14 MMOL/L (ref 3–16)
AST SERPL-CCNC: 20 U/L (ref 15–37)
BASOPHILS # BLD: 0.1 K/UL (ref 0–0.2)
BASOPHILS NFR BLD: 1 %
BILIRUB SERPL-MCNC: 0.5 MG/DL (ref 0–1)
BUN SERPL-MCNC: 6 MG/DL (ref 7–20)
CALCIUM SERPL-MCNC: 9.7 MG/DL (ref 8.3–10.6)
CHLORIDE SERPL-SCNC: 100 MMOL/L (ref 99–110)
CHOLEST SERPL-MCNC: 259 MG/DL (ref 0–199)
CO2 SERPL-SCNC: 24 MMOL/L (ref 21–32)
CREAT SERPL-MCNC: 1.1 MG/DL (ref 0.9–1.3)
DEPRECATED RDW RBC AUTO: 16.2 % (ref 12.4–15.4)
EOSINOPHIL # BLD: 0.2 K/UL (ref 0–0.6)
EOSINOPHIL NFR BLD: 2.3 %
EST. AVERAGE GLUCOSE BLD GHB EST-MCNC: 137 MG/DL
GFR SERPLBLD CREATININE-BSD FMLA CKD-EPI: >60 ML/MIN/{1.73_M2}
GLUCOSE SERPL-MCNC: 82 MG/DL (ref 70–99)
HBA1C MFR BLD: 6.4 %
HCT VFR BLD AUTO: 46.7 % (ref 40.5–52.5)
HDLC SERPL-MCNC: 30 MG/DL (ref 40–60)
HGB BLD-MCNC: 16.3 G/DL (ref 13.5–17.5)
LDLC SERPL CALC-MCNC: ABNORMAL MG/DL
LDLC SERPL-MCNC: 175 MG/DL
LYMPHOCYTES # BLD: 3.5 K/UL (ref 1–5.1)
LYMPHOCYTES NFR BLD: 44.8 %
MCH RBC QN AUTO: 34.5 PG (ref 26–34)
MCHC RBC AUTO-ENTMCNC: 34.9 G/DL (ref 31–36)
MCV RBC AUTO: 99 FL (ref 80–100)
MONOCYTES # BLD: 0.4 K/UL (ref 0–1.3)
MONOCYTES NFR BLD: 5.1 %
NEUTROPHILS # BLD: 3.6 K/UL (ref 1.7–7.7)
NEUTROPHILS NFR BLD: 46.8 %
PLATELET # BLD AUTO: 267 K/UL (ref 135–450)
PMV BLD AUTO: 9 FL (ref 5–10.5)
POTASSIUM SERPL-SCNC: 4.6 MMOL/L (ref 3.5–5.1)
PROT SERPL-MCNC: 7.1 G/DL (ref 6.4–8.2)
RBC # BLD AUTO: 4.72 M/UL (ref 4.2–5.9)
SODIUM SERPL-SCNC: 138 MMOL/L (ref 136–145)
TRIGL SERPL-MCNC: 309 MG/DL (ref 0–150)
TSH SERPL DL<=0.005 MIU/L-ACNC: 1.34 UIU/ML (ref 0.27–4.2)
VLDLC SERPL CALC-MCNC: ABNORMAL MG/DL
WBC # BLD AUTO: 7.7 K/UL (ref 4–11)

## 2023-09-11 ENCOUNTER — TELEPHONE (OUTPATIENT)
Dept: FAMILY MEDICINE CLINIC | Age: 53
End: 2023-09-11

## 2023-11-08 ENCOUNTER — OFFICE VISIT (OUTPATIENT)
Dept: SURGERY | Age: 53
End: 2023-11-08
Payer: COMMERCIAL

## 2023-11-08 VITALS
SYSTOLIC BLOOD PRESSURE: 112 MMHG | HEART RATE: 86 BPM | HEIGHT: 70 IN | DIASTOLIC BLOOD PRESSURE: 69 MMHG | BODY MASS INDEX: 33.13 KG/M2 | WEIGHT: 231.4 LBS

## 2023-11-08 DIAGNOSIS — L05.91 PILONIDAL CYST: Primary | ICD-10-CM

## 2023-11-08 PROCEDURE — 99212 OFFICE O/P EST SF 10 MIN: CPT | Performed by: SURGERY

## 2023-11-13 ENCOUNTER — TELEPHONE (OUTPATIENT)
Dept: SURGERY | Age: 53
End: 2023-11-13

## 2023-11-13 NOTE — TELEPHONE ENCOUNTER
Pt was seen in the office last week w/Dr. Vargas Valdovinos. Pt was curious to what available sx dates Dr. Vargas Valdovinos has and if he should get scheduled for sx before the year ends or wait until next year. Please give him a call, thank you.

## 2023-11-14 NOTE — PROGRESS NOTES
HPI: Nursing notes reviewed. Patient reports drainage from site of prior pilondal cyst.  Some days more than others. Mild discomfort if swollen. No fevers. ROS:  10 point review of systems performed with pertinent positives in HPI    Phys:    Skin - recurrent pilonidal cyst without signs of infection    Assesment: 49 yo with recurrent pilonidal cyst    Plan: Discussed their diagnosis and indications for operation. Risks of operation and typical recovery reviewed. Plan for OR after he checks his schedule.     Lisa Colorado MD

## 2023-12-01 NOTE — PROGRESS NOTES
Danyell Moulding    Age 50 y.o.    male    1970    MRN [id number]    12/8/2023  Arrival Time_____________  OR Time____________[address]     Procedure(s):  PILONIDAL CYSTECTOMY                      General   Surgeon(s):  Jemal Hind, MD      DAY ADMIT ___  SDS/OP ___  Rosezena Lose IN BED ___        Phone [phone] (home)     PCP _____________________ Phone_________________ Epic ( ) Epic CE ( ) Appt ________    ADDITIONAL INFO __________________________________ Cardio/Consult _____________    NOTES _____________________________________________________________________    ____________________________________________________________________________    PAT APPT DATE:________ TIME: ________  FAXED QAD: _______  (__) H&P w/ Hospitalist  __________________________________________________________________________  Preop Nurse phone screen complete: _____________  (__) CBC     (__) W/ DIFF ___________     (__) Hgb A1C    ___________  (__) CHEST X RAY   __________  (__) LIPID PROFILE  ___________  (__) EKG   __________  (__) PT-INR / APTT  ___________  (__) PFT's   __________  (__) BMP   ___________  (__) CAROTIDS  __________  (__) CMP   ___________  (__) VEIN MAPPING  __________  (__) U/A   ___________  (__) HISTORY & PHYSICAL __________  (__) URINE C & S  ___________  (__) CARDIAC CLEARANCE __________  (__) U/A W/ FLEX  ___________  (__) PULM.  CLEARANCE __________  (__) SERUM PREGNANCY ___________  (__) Check Epic DOS orders __________  (__) TYPE & SCREEN __________repeat ( ) (__)  __________________ __________  (__) Albumin / Prealbumin ___________  (__)  __________________ __________  (__) TRANSFERRIN  ___________  (__)  __________________ __________  (__) LIVER PROFILE  ___________  (__)  __________________ __________  (__) MRSA NASAL SWAB ___________  (__) URINE PREG DOS __________  (__) SED RATE  ___________  (__) BLOOD SUGAR DOS __________  (__) C-REACTIVE PROTEIN ___________    (__) VITAMIN D

## 2023-12-04 NOTE — PROGRESS NOTES
Surgery Date and Time: 07:30 am    Arrival Time:  06:00 am    The instructions given when and if a patient needs to stop oral intake prior to surgery varies. Follow the instructions you were given by your    Surgeon or RN during the Pre-op call. __X__Nothing to eat or to drink after Midnight the night before the surgery. NO gum, mints, candy or ice chips day of surgery. Only take the following medications with a small sip of water the morning of surgery: none           Aspirin, Ibuprofen, Advil, Naproxen, Vitamin E and other Anti-inflammatory products and supplements should be stopped for 5 -7days before surgery      or as directed by your physician. - Do not smoke or vape, and do not drink any alcoholic beverages 24 hours prior to surgery, this includes NA Beer. Refrain from using any recreational drugs,     including non-prescribed prescription drugs.       -You may brush your teeth and gargle the morning of surgery. DO NOT SWALLOW WATER.      -You MUST plan for a responsible adult to stay on site while you are here and take you home after your surgery. You will not be allowed to leave alone or drive               yourself home. It is requested someone stay with you the first 24 hrs. Your surgery will be cancelled if you do not have a ride home with a responsible adult.      -A parent/legal guardian must accompany a child scheduled for surgery and plan to stay at the hospital until the child is discharged. Please do not bring other                children with you. -Please wear simple, loose-fitting clothing to the hospital. Leartis Cleverly not bring valuables (money, credit cards, checkbooks, etc.) Do not wear any makeup (including                no eye makeup) and no nail polish if applicable. - DO NOT wear any jewelry or body piercings day of surgery. All body piercing jewelry must be removed.                - If you have dentures they will be removed before going to the OR; we will provide a container. If you wear contact lenses or glasses, they will be removed,               bring a case for them or wear glasses day of surgery. - Please see your family doctor/pediatrician for a Preop History & Physical and/or concerning medications within 30 days of the surgery date. Non-[facility] physicians can fax H&P/test results to [phone]. You may need cardiac clearance if you see a cardiologist, check with PCP or surgeon.                -If you have a Living Will and Durable Power of  for Healthcare, please bring in a copy to be scanned at registration.                -Notify your Surgeon if you develop any illness between now and the surgery date, cough, cold, fever, sore throat, nausea, vomiting, etc.  Please notify your                surgeon if you experience dizziness, shortness of breath or blurred vision between now & the time of your surgery. -DO NOT shave your operative site less than 4 days prior to surgery. For face & neck surgery, men may use an electric razor up to 2 days prior to surgery.   -To help prevent infection, change your sheets the night before surgery. Also, shower the night before & morning of surgery using an antibacterial     soap (Dial or Safeguard)  Do not apply lotion after shower or day of surgery.                -To provide excellent care visitors will be limited to two per room at any given time.                -Please bring your picture ID and insurance card for registration prior to arriving to second floor surgery department.                -If you use a CPAP/BiPAP please bring with you on the day of the surgery. If you use oxygen, please bring portable tank with you.                -For your convenience [facility] has an outpatient pharmacy on site to fill your prescriptions prior to 5 pm.                -Bring a complete list of all your medications with name and dose including any supplements.

## 2023-12-05 ENCOUNTER — OFFICE VISIT (OUTPATIENT)
Dept: FAMILY MEDICINE CLINIC | Age: 53
End: 2023-12-05
Payer: COMMERCIAL

## 2023-12-05 VITALS
BODY MASS INDEX: 32.61 KG/M2 | HEART RATE: 93 BPM | HEIGHT: 70 IN | SYSTOLIC BLOOD PRESSURE: 119 MMHG | OXYGEN SATURATION: 97 % | DIASTOLIC BLOOD PRESSURE: 83 MMHG | WEIGHT: 227.8 LBS

## 2023-12-05 DIAGNOSIS — Z01.818 PRE-OP EXAM: Primary | ICD-10-CM

## 2023-12-05 DIAGNOSIS — Z01.818 PRE-OP EXAM: ICD-10-CM

## 2023-12-05 LAB
ALBUMIN SERPL-MCNC: 4.7 G/DL (ref 3.4–5)
ALBUMIN/GLOB SERPL: 1.6 {RATIO} (ref 1.1–2.2)
ALP SERPL-CCNC: 71 U/L (ref 40–129)
ALT SERPL-CCNC: 19 U/L (ref 10–40)
ANION GAP SERPL CALCULATED.3IONS-SCNC: 12 MMOL/L (ref 3–16)
AST SERPL-CCNC: 18 U/L (ref 15–37)
BASOPHILS # BLD: 0 K/UL (ref 0–0.2)
BASOPHILS NFR BLD: 0.5 %
BILIRUB SERPL-MCNC: 0.5 MG/DL (ref 0–1)
BUN SERPL-MCNC: 6 MG/DL (ref 7–20)
CALCIUM SERPL-MCNC: 9.8 MG/DL (ref 8.3–10.6)
CHLORIDE SERPL-SCNC: 103 MMOL/L (ref 99–110)
CO2 SERPL-SCNC: 25 MMOL/L (ref 21–32)
CREAT SERPL-MCNC: 1 MG/DL (ref 0.9–1.3)
DEPRECATED RDW RBC AUTO: 14.1 % (ref 12.4–15.4)
EOSINOPHIL # BLD: 0.2 K/UL (ref 0–0.6)
EOSINOPHIL NFR BLD: 2.9 %
GFR SERPLBLD CREATININE-BSD FMLA CKD-EPI: >60 ML/MIN/{1.73_M2}
GLUCOSE SERPL-MCNC: 97 MG/DL (ref 70–99)
HCT VFR BLD AUTO: 51.3 % (ref 40.5–52.5)
HGB BLD-MCNC: 17.6 G/DL (ref 13.5–17.5)
LYMPHOCYTES # BLD: 3.2 K/UL (ref 1–5.1)
LYMPHOCYTES NFR BLD: 41 %
MCH RBC QN AUTO: 33.4 PG (ref 26–34)
MCHC RBC AUTO-ENTMCNC: 34.3 G/DL (ref 31–36)
MCV RBC AUTO: 97.5 FL (ref 80–100)
MONOCYTES # BLD: 0.5 K/UL (ref 0–1.3)
MONOCYTES NFR BLD: 6.4 %
NEUTROPHILS # BLD: 3.8 K/UL (ref 1.7–7.7)
NEUTROPHILS NFR BLD: 49.2 %
PLATELET # BLD AUTO: 313 K/UL (ref 135–450)
PMV BLD AUTO: 9.2 FL (ref 5–10.5)
POTASSIUM SERPL-SCNC: 4.8 MMOL/L (ref 3.5–5.1)
PROT SERPL-MCNC: 7.6 G/DL (ref 6.4–8.2)
RBC # BLD AUTO: 5.27 M/UL (ref 4.2–5.9)
SODIUM SERPL-SCNC: 140 MMOL/L (ref 136–145)
WBC # BLD AUTO: 7.8 K/UL (ref 4–11)

## 2023-12-05 PROCEDURE — 93000 ELECTROCARDIOGRAM COMPLETE: CPT | Performed by: SURGERY

## 2023-12-05 PROCEDURE — 99213 OFFICE O/P EST LOW 20 MIN: CPT | Performed by: SURGERY

## 2023-12-05 ASSESSMENT — ENCOUNTER SYMPTOMS
ABDOMINAL PAIN: 0
SORE THROAT: 0
SHORTNESS OF BREATH: 0
NAUSEA: 0
CONSTIPATION: 0
DIARRHEA: 0
COUGH: 0

## 2023-12-07 ENCOUNTER — TELEPHONE (OUTPATIENT)
Dept: SURGERY | Age: 53
End: 2023-12-07

## 2023-12-07 ENCOUNTER — ANESTHESIA EVENT (OUTPATIENT)
Dept: OPERATING ROOM | Age: 53
End: 2023-12-07
Payer: COMMERCIAL

## 2023-12-07 NOTE — TELEPHONE ENCOUNTER
Pt saw Dr. Padilla Gave in the office 11/08/23 called back 11/21/23 to schedule a Pilonidal Cystectomy (General Anes) scheduled 12/08/23 @ 0730/0600 arrival @ [facility] after midnight evening b/f surgery-Pt will need  day of surgery- Dr Germaine Garcia will do pts pre-op physical-pt was faxed surgery instruction-Pt understood and agreed with above noted.

## 2023-12-08 ENCOUNTER — HOSPITAL ENCOUNTER (OUTPATIENT)
Age: 53
Setting detail: OUTPATIENT SURGERY
Discharge: HOME | End: 2023-12-08
Attending: SURGERY | Admitting: SURGERY
Payer: COMMERCIAL

## 2023-12-08 ENCOUNTER — ANESTHESIA (OUTPATIENT)
Dept: OPERATING ROOM | Age: 53
End: 2023-12-08
Payer: COMMERCIAL

## 2023-12-08 VITALS
TEMPERATURE: 97.8 F | RESPIRATION RATE: 17 BRPM | HEART RATE: 86 BPM | OXYGEN SATURATION: 93 % | DIASTOLIC BLOOD PRESSURE: 45 MMHG | BODY MASS INDEX: 32.21 KG/M2 | SYSTOLIC BLOOD PRESSURE: 91 MMHG | WEIGHT: 225 LBS | HEIGHT: 70 IN

## 2023-12-08 DIAGNOSIS — G89.18 POSTOPERATIVE PAIN: Primary | ICD-10-CM

## 2023-12-08 DIAGNOSIS — L05.91 PILONIDAL CYST: ICD-10-CM

## 2023-12-08 PROCEDURE — 11771 EXC PILONIDAL CYST XTNSV: CPT | Performed by: SURGERY

## 2023-12-08 PROCEDURE — 3600000002 HC SURGERY LEVEL 2 BASE: Performed by: SURGERY

## 2023-12-08 PROCEDURE — 6360000002 HC RX W HCPCS

## 2023-12-08 PROCEDURE — 2580000003 HC RX 258

## 2023-12-08 PROCEDURE — 3600000012 HC SURGERY LEVEL 2 ADDTL 15MIN: Performed by: SURGERY

## 2023-12-08 PROCEDURE — 6360000002 HC RX W HCPCS: Performed by: SURGERY

## 2023-12-08 PROCEDURE — 7100000010 HC PHASE II RECOVERY - FIRST 15 MIN: Performed by: SURGERY

## 2023-12-08 PROCEDURE — 3700000000 HC ANESTHESIA ATTENDED CARE: Performed by: SURGERY

## 2023-12-08 PROCEDURE — 2709999900 HC NON-CHARGEABLE SUPPLY: Performed by: SURGERY

## 2023-12-08 PROCEDURE — A4217 STERILE WATER/SALINE, 500 ML: HCPCS | Performed by: SURGERY

## 2023-12-08 PROCEDURE — 3700000001 HC ADD 15 MINUTES (ANESTHESIA): Performed by: SURGERY

## 2023-12-08 PROCEDURE — 7100000011 HC PHASE II RECOVERY - ADDTL 15 MIN: Performed by: SURGERY

## 2023-12-08 PROCEDURE — 88304 TISSUE EXAM BY PATHOLOGIST: CPT

## 2023-12-08 PROCEDURE — 7100000001 HC PACU RECOVERY - ADDTL 15 MIN: Performed by: SURGERY

## 2023-12-08 PROCEDURE — C9290 INJ, BUPIVACAINE LIPOSOME: HCPCS | Performed by: SURGERY

## 2023-12-08 PROCEDURE — 6360000002 HC RX W HCPCS: Performed by: ANESTHESIOLOGY

## 2023-12-08 PROCEDURE — 7100000000 HC PACU RECOVERY - FIRST 15 MIN: Performed by: SURGERY

## 2023-12-08 PROCEDURE — 2580000003 HC RX 258: Performed by: SURGERY

## 2023-12-08 PROCEDURE — 2500000003 HC RX 250 WO HCPCS

## 2023-12-08 PROCEDURE — 6370000000 HC RX 637 (ALT 250 FOR IP): Performed by: ANESTHESIOLOGY

## 2023-12-08 RX ORDER — MAGNESIUM HYDROXIDE 1200 MG/15ML
LIQUID ORAL CONTINUOUS PRN
Status: DISCONTINUED | OUTPATIENT
Start: 2023-12-08 | End: 2023-12-08 | Stop reason: HOSPADM

## 2023-12-08 RX ORDER — KETOROLAC TROMETHAMINE 30 MG/ML
INJECTION, SOLUTION INTRAMUSCULAR; INTRAVENOUS PRN
Status: DISCONTINUED | OUTPATIENT
Start: 2023-12-08 | End: 2023-12-08 | Stop reason: SDUPTHER

## 2023-12-08 RX ORDER — SODIUM CHLORIDE, SODIUM LACTATE, POTASSIUM CHLORIDE, CALCIUM CHLORIDE 600; 310; 30; 20 MG/100ML; MG/100ML; MG/100ML; MG/100ML
INJECTION, SOLUTION INTRAVENOUS CONTINUOUS
Status: DISCONTINUED | OUTPATIENT
Start: 2023-12-08 | End: 2023-12-08 | Stop reason: HOSPADM

## 2023-12-08 RX ORDER — DIPHENHYDRAMINE HYDROCHLORIDE 50 MG/ML
12.5 INJECTION INTRAMUSCULAR; INTRAVENOUS
Status: DISCONTINUED | OUTPATIENT
Start: 2023-12-08 | End: 2023-12-08 | Stop reason: HOSPADM

## 2023-12-08 RX ORDER — ROCURONIUM BROMIDE 10 MG/ML
INJECTION, SOLUTION INTRAVENOUS PRN
Status: DISCONTINUED | OUTPATIENT
Start: 2023-12-08 | End: 2023-12-08 | Stop reason: SDUPTHER

## 2023-12-08 RX ORDER — MIDAZOLAM HYDROCHLORIDE 1 MG/ML
INJECTION INTRAMUSCULAR; INTRAVENOUS PRN
Status: DISCONTINUED | OUTPATIENT
Start: 2023-12-08 | End: 2023-12-08 | Stop reason: SDUPTHER

## 2023-12-08 RX ORDER — MEPERIDINE HYDROCHLORIDE 50 MG/ML
12.5 INJECTION INTRAMUSCULAR; INTRAVENOUS; SUBCUTANEOUS EVERY 5 MIN PRN
Status: DISCONTINUED | OUTPATIENT
Start: 2023-12-08 | End: 2023-12-08 | Stop reason: HOSPADM

## 2023-12-08 RX ORDER — SODIUM CHLORIDE, SODIUM LACTATE, POTASSIUM CHLORIDE, CALCIUM CHLORIDE 600; 310; 30; 20 MG/100ML; MG/100ML; MG/100ML; MG/100ML
INJECTION, SOLUTION INTRAVENOUS CONTINUOUS PRN
Status: DISCONTINUED | OUTPATIENT
Start: 2023-12-08 | End: 2023-12-08 | Stop reason: SDUPTHER

## 2023-12-08 RX ORDER — LIDOCAINE HYDROCHLORIDE 20 MG/ML
INJECTION, SOLUTION EPIDURAL; INFILTRATION; INTRACAUDAL; PERINEURAL PRN
Status: DISCONTINUED | OUTPATIENT
Start: 2023-12-08 | End: 2023-12-08 | Stop reason: SDUPTHER

## 2023-12-08 RX ORDER — OXYCODONE HYDROCHLORIDE 5 MG/1
10 TABLET ORAL PRN
Status: COMPLETED | OUTPATIENT
Start: 2023-12-08 | End: 2023-12-08

## 2023-12-08 RX ORDER — ONDANSETRON 2 MG/ML
4 INJECTION INTRAMUSCULAR; INTRAVENOUS
Status: DISCONTINUED | OUTPATIENT
Start: 2023-12-08 | End: 2023-12-08 | Stop reason: HOSPADM

## 2023-12-08 RX ORDER — SODIUM CHLORIDE 0.9 % (FLUSH) 0.9 %
5-40 SYRINGE (ML) INJECTION EVERY 12 HOURS SCHEDULED
Status: DISCONTINUED | OUTPATIENT
Start: 2023-12-08 | End: 2023-12-08 | Stop reason: HOSPADM

## 2023-12-08 RX ORDER — FENTANYL CITRATE 50 UG/ML
INJECTION, SOLUTION INTRAMUSCULAR; INTRAVENOUS PRN
Status: DISCONTINUED | OUTPATIENT
Start: 2023-12-08 | End: 2023-12-08 | Stop reason: SDUPTHER

## 2023-12-08 RX ORDER — LABETALOL HYDROCHLORIDE 5 MG/ML
5 INJECTION, SOLUTION INTRAVENOUS EVERY 10 MIN PRN
Status: DISCONTINUED | OUTPATIENT
Start: 2023-12-08 | End: 2023-12-08 | Stop reason: HOSPADM

## 2023-12-08 RX ORDER — PROPOFOL 10 MG/ML
INJECTION, EMULSION INTRAVENOUS PRN
Status: DISCONTINUED | OUTPATIENT
Start: 2023-12-08 | End: 2023-12-08 | Stop reason: SDUPTHER

## 2023-12-08 RX ORDER — CEFAZOLIN SODIUM IN 0.9 % NACL 2 G/100 ML
2000 PLASTIC BAG, INJECTION (ML) INTRAVENOUS
Status: COMPLETED | OUTPATIENT
Start: 2023-12-08 | End: 2023-12-08

## 2023-12-08 RX ORDER — SODIUM CHLORIDE 0.9 % (FLUSH) 0.9 %
5-40 SYRINGE (ML) INJECTION PRN
Status: DISCONTINUED | OUTPATIENT
Start: 2023-12-08 | End: 2023-12-08 | Stop reason: HOSPADM

## 2023-12-08 RX ORDER — DEXAMETHASONE SODIUM PHOSPHATE 4 MG/ML
INJECTION, SOLUTION INTRA-ARTICULAR; INTRALESIONAL; INTRAMUSCULAR; INTRAVENOUS; SOFT TISSUE PRN
Status: DISCONTINUED | OUTPATIENT
Start: 2023-12-08 | End: 2023-12-08 | Stop reason: SDUPTHER

## 2023-12-08 RX ORDER — SODIUM CHLORIDE 9 MG/ML
INJECTION, SOLUTION INTRAVENOUS PRN
Status: DISCONTINUED | OUTPATIENT
Start: 2023-12-08 | End: 2023-12-08 | Stop reason: HOSPADM

## 2023-12-08 RX ORDER — ONDANSETRON 2 MG/ML
INJECTION INTRAMUSCULAR; INTRAVENOUS PRN
Status: DISCONTINUED | OUTPATIENT
Start: 2023-12-08 | End: 2023-12-08 | Stop reason: SDUPTHER

## 2023-12-08 RX ORDER — OXYCODONE HYDROCHLORIDE 5 MG/1
5 TABLET ORAL PRN
Status: COMPLETED | OUTPATIENT
Start: 2023-12-08 | End: 2023-12-08

## 2023-12-08 RX ORDER — LIDOCAINE HYDROCHLORIDE 10 MG/ML
1 INJECTION, SOLUTION EPIDURAL; INFILTRATION; INTRACAUDAL; PERINEURAL
Status: DISCONTINUED | OUTPATIENT
Start: 2023-12-08 | End: 2023-12-08 | Stop reason: HOSPADM

## 2023-12-08 RX ORDER — MIDAZOLAM HYDROCHLORIDE 1 MG/ML
2 INJECTION INTRAMUSCULAR; INTRAVENOUS
Status: DISCONTINUED | OUTPATIENT
Start: 2023-12-08 | End: 2023-12-08 | Stop reason: HOSPADM

## 2023-12-08 RX ORDER — OXYCODONE HYDROCHLORIDE 5 MG/1
5 TABLET ORAL EVERY 6 HOURS PRN
Qty: 20 TABLET | Refills: 0 | Status: SHIPPED | OUTPATIENT
Start: 2023-12-08 | End: 2023-12-13

## 2023-12-08 RX ADMIN — SODIUM CHLORIDE, SODIUM LACTATE, POTASSIUM CHLORIDE, AND CALCIUM CHLORIDE: .6; .31; .03; .02 INJECTION, SOLUTION INTRAVENOUS at 08:43

## 2023-12-08 RX ADMIN — SODIUM CHLORIDE, SODIUM LACTATE, POTASSIUM CHLORIDE, AND CALCIUM CHLORIDE: .6; .31; .03; .02 INJECTION, SOLUTION INTRAVENOUS at 07:28

## 2023-12-08 RX ADMIN — FENTANYL CITRATE 50 MCG: 50 INJECTION, SOLUTION INTRAMUSCULAR; INTRAVENOUS at 08:45

## 2023-12-08 RX ADMIN — Medication 2000 MG: at 07:46

## 2023-12-08 RX ADMIN — SUGAMMADEX 200 MG: 100 INJECTION, SOLUTION INTRAVENOUS at 08:50

## 2023-12-08 RX ADMIN — FENTANYL CITRATE 50 MCG: 50 INJECTION, SOLUTION INTRAMUSCULAR; INTRAVENOUS at 08:09

## 2023-12-08 RX ADMIN — HYDROMORPHONE HYDROCHLORIDE 0.5 MG: 1 INJECTION, SOLUTION INTRAMUSCULAR; INTRAVENOUS; SUBCUTANEOUS at 09:36

## 2023-12-08 RX ADMIN — MIDAZOLAM 2 MG: 1 INJECTION INTRAMUSCULAR; INTRAVENOUS at 07:31

## 2023-12-08 RX ADMIN — DEXAMETHASONE SODIUM PHOSPHATE 8 MG: 4 INJECTION, SOLUTION INTRAMUSCULAR; INTRAVENOUS at 07:50

## 2023-12-08 RX ADMIN — ROCURONIUM BROMIDE 50 MG: 50 INJECTION, SOLUTION INTRAVENOUS at 07:40

## 2023-12-08 RX ADMIN — ONDANSETRON 4 MG: 2 INJECTION INTRAMUSCULAR; INTRAVENOUS at 07:50

## 2023-12-08 RX ADMIN — KETOROLAC TROMETHAMINE 30 MG: 30 INJECTION, SOLUTION INTRAMUSCULAR; INTRAVENOUS at 08:50

## 2023-12-08 RX ADMIN — LIDOCAINE HYDROCHLORIDE 100 MG: 20 INJECTION, SOLUTION EPIDURAL; INFILTRATION; INTRACAUDAL at 07:40

## 2023-12-08 RX ADMIN — PROPOFOL 200 MG: 10 INJECTION, EMULSION INTRAVENOUS at 07:40

## 2023-12-08 RX ADMIN — OXYCODONE 10 MG: 5 TABLET ORAL at 09:44

## 2023-12-08 RX ADMIN — FENTANYL CITRATE 100 MCG: 50 INJECTION, SOLUTION INTRAMUSCULAR; INTRAVENOUS at 07:39

## 2023-12-08 ASSESSMENT — PAIN DESCRIPTION - LOCATION
LOCATION: BUTTOCKS
LOCATION: BUTTOCKS

## 2023-12-08 ASSESSMENT — PAIN SCALES - GENERAL
PAINLEVEL_OUTOF10: 4
PAINLEVEL_OUTOF10: 5

## 2023-12-08 ASSESSMENT — PAIN - FUNCTIONAL ASSESSMENT: PAIN_FUNCTIONAL_ASSESSMENT: 0-10

## 2023-12-08 NOTE — ANESTHESIA POSTPROCEDURE EVALUATION
Department of Anesthesiology  Postprocedure Note    Patient: Gabby Olvera  MRN: [id number]  YOB: 1970  Date of evaluation: 12/8/2023      Procedure Summary       Date: 12/08/23 Room / Location: [address] [place] [address] / [address] [place] [address]    Anesthesia Start: 5893 Anesthesia Stop: 2116    Procedure: PILONIDAL CYSTECTOMY (Buttocks) Diagnosis:       Pilonidal cyst      (Pilonidal cyst [L05.91])    Surgeons: Tasneem Ryan MD Responsible Provider: Nicole Hodge MD    Anesthesia Type: General ASA Status: 3            Anesthesia Type: General    Ryan Phase I: Ryan Score: 8    Ryan Phase II: Ryan Score: 10      Anesthesia Post Evaluation    Patient location during evaluation: PACU  Patient participation: complete - patient participated  Level of consciousness: awake and alert  Pain score: 0  Airway patency: patent  Nausea & Vomiting: no nausea and no vomiting  Complications: no  Cardiovascular status: blood pressure returned to baseline  Respiratory status: acceptable  Hydration status: stable  Pain management: adequate

## 2023-12-08 NOTE — PROGRESS NOTES
Patient admitted to [facility] 6 in preparation for surgery, VSS. Pt alert and oriented x4. Pt stated discomfort. Consent confirmed. IV inserted into R FA, LR infusing. Belongings placed in labeled bag and on cart to PACU. Daughter Magdalena Schroeder at bedside.

## 2023-12-08 NOTE — OP NOTE
Date of Surgery: 12/8/23    Preop Dx:  Recurrent Pilonidal Cyst    Postop Dx:  Same    Procedure:  Pilonidal Cystectomy       Surgeon:  Lenin Carlos    Assistant:      Anesthesia:  GETA    EBL:   <50ml    Specimen:  Pilonidal cyst    Complications: none    Drains/Lines:  none    Indications:  49 yo with recurrent pilonidal cyst with drainage    Description:  Patient was given adequate description of the risks and rewards of the procedure, including bleeding, infection, wound not healing completely, recurrence and freely consented. He was given appropriate antibiotics and brought to the OR where GETA anesthesia was induced. He was placed in prone jackknife position. Prepped and draped in usual sterile fashion. Incision was made with a #15 blade in an ovoid fashion to include all of the pilonidal cyst tissue. This was carried down to the fascial level with electrocautery. The tissue was then excised and sent as specimen. The cavity was inspected and made hemostatic with electrocautery. It was then irrigated with normal saline. Initial closure was with 3-0 vicryl interrupted suture incorporating the deep subcutaneous tissue and a portion of the presacral fascia. A second layer of 3-0 vicryl was placed. 2-0 nylon mattress suture reapproximated the epidermis. Sterile dressing placed. All suture, sponge and instrument count correct times two at end of case. Transferred to PACU in stable condition.     Ruthine Olszewski MD

## 2023-12-08 NOTE — H&P
I have reviewed the history and physical and examined the patient. I find no relevant changes. I have reviewed with the patient and/or family members, during the preoperative office visit the risks, benefits, and alternatives to the procedure.     Torsten Lanza MD

## 2024-01-08 ENCOUNTER — OFFICE VISIT (OUTPATIENT)
Dept: SURGERY | Age: 54
End: 2024-01-08

## 2024-01-08 VITALS
SYSTOLIC BLOOD PRESSURE: 160 MMHG | BODY MASS INDEX: 32.44 KG/M2 | DIASTOLIC BLOOD PRESSURE: 90 MMHG | WEIGHT: 226.6 LBS | HEIGHT: 70 IN

## 2024-01-08 DIAGNOSIS — Z09 POSTOP CHECK: Primary | ICD-10-CM

## 2024-01-08 PROCEDURE — 99024 POSTOP FOLLOW-UP VISIT: CPT | Performed by: SURGERY

## 2024-01-15 NOTE — PROGRESS NOTES
HPI: Nursing notes reviewed.  Patient without pain.  No fevers.  Less drainage.    ROS:  10 point review of systems performed with pertinent positives in HPI    Phys:    Wound - smaller opening and no tunneling, no signs of infection    Assesment: 54 yo s/p pilonidal cystectomy    Plan: 1.  Continue wet to dry dressigns for about another week then transition to dry gauze only.   2.  Follow up around two weeks

## 2024-01-29 ENCOUNTER — OFFICE VISIT (OUTPATIENT)
Dept: SURGERY | Age: 54
End: 2024-01-29

## 2024-01-29 VITALS
SYSTOLIC BLOOD PRESSURE: 142 MMHG | WEIGHT: 224.2 LBS | BODY MASS INDEX: 32.1 KG/M2 | HEIGHT: 70 IN | DIASTOLIC BLOOD PRESSURE: 84 MMHG

## 2024-01-29 DIAGNOSIS — Z09 POSTOP CHECK: Primary | ICD-10-CM

## 2024-01-29 PROCEDURE — 99024 POSTOP FOLLOW-UP VISIT: CPT | Performed by: SURGERY

## 2024-01-29 NOTE — PROGRESS NOTES
HPI: Nursing notes reviewed.  Patient without pain or drainage.    ROS:  10 point review of systems performed with pertinent positives in HPI    Phys:    Incision - healed    Assesment: 54 yo s/p pilonidal cystectomy    Plan: 1.  Wound healed and no signs of infection or recurrence   2.  No activity restrictions   3.  Call with concerns

## 2024-05-07 ENCOUNTER — TELEPHONE (OUTPATIENT)
Dept: PULMONOLOGY | Age: 54
End: 2024-05-07

## 2024-05-07 ENCOUNTER — TELEMEDICINE (OUTPATIENT)
Dept: SLEEP MEDICINE | Age: 54
End: 2024-05-07
Payer: COMMERCIAL

## 2024-05-07 DIAGNOSIS — E66.9 OBESITY (BMI 30-39.9): ICD-10-CM

## 2024-05-07 DIAGNOSIS — Z71.89 CPAP USE COUNSELING: ICD-10-CM

## 2024-05-07 DIAGNOSIS — G47.33 MODERATE OBSTRUCTIVE SLEEP APNEA: Primary | ICD-10-CM

## 2024-05-07 PROCEDURE — 99213 OFFICE O/P EST LOW 20 MIN: CPT | Performed by: NURSE PRACTITIONER

## 2024-05-07 ASSESSMENT — SLEEP AND FATIGUE QUESTIONNAIRES
HOW LIKELY ARE YOU TO NOD OFF OR FALL ASLEEP WHILE SITTING INACTIVE IN A PUBLIC PLACE: SLIGHT CHANCE OF DOZING
HOW LIKELY ARE YOU TO NOD OFF OR FALL ASLEEP WHEN YOU ARE A PASSENGER IN A CAR FOR AN HOUR WITHOUT A BREAK: SLIGHT CHANCE OF DOZING
HOW LIKELY ARE YOU TO NOD OFF OR FALL ASLEEP WHILE SITTING INACTIVE IN A PUBLIC PLACE: WOULD NEVER DOZE
HOW LIKELY ARE YOU TO NOD OFF OR FALL ASLEEP WHILE SITTING AND TALKING TO SOMEONE: WOULD NEVER DOZE
HOW LIKELY ARE YOU TO NOD OFF OR FALL ASLEEP WHILE LYING DOWN TO REST IN THE AFTERNOON WHEN CIRCUMSTANCES PERMIT: MODERATE CHANCE OF DOZING
HOW LIKELY ARE YOU TO NOD OFF OR FALL ASLEEP IN A CAR, WHILE STOPPED FOR A FEW MINUTES IN TRAFFIC: WOULD NEVER DOZE
HOW LIKELY ARE YOU TO NOD OFF OR FALL ASLEEP WHILE WATCHING TV: MODERATE CHANCE OF DOZING
HOW LIKELY ARE YOU TO NOD OFF OR FALL ASLEEP WHILE SITTING QUIETLY AFTER LUNCH WITHOUT ALCOHOL: SLIGHT CHANCE OF DOZING
HOW LIKELY ARE YOU TO NOD OFF OR FALL ASLEEP WHILE LYING DOWN TO REST IN THE AFTERNOON WHEN CIRCUMSTANCES PERMIT: MODERATE CHANCE OF DOZING
HOW LIKELY ARE YOU TO NOD OFF OR FALL ASLEEP WHILE SITTING AND READING: SLIGHT CHANCE OF DOZING
HOW LIKELY ARE YOU TO NOD OFF OR FALL ASLEEP WHILE SITTING AND TALKING TO SOMEONE: SLIGHT CHANCE OF DOZING
ESS TOTAL SCORE: 7
HOW LIKELY ARE YOU TO NOD OFF OR FALL ASLEEP WHILE SITTING QUIETLY AFTER LUNCH WITHOUT ALCOHOL: SLIGHT CHANCE OF DOZING
ESS TOTAL SCORE: 10
HOW LIKELY ARE YOU TO NOD OFF OR FALL ASLEEP WHILE WATCHING TV: MODERATE CHANCE OF DOZING
HOW LIKELY ARE YOU TO NOD OFF OR FALL ASLEEP IN A CAR, WHILE STOPPED FOR A FEW MINUTES IN TRAFFIC: SLIGHT CHANCE OF DOZING
HOW LIKELY ARE YOU TO NOD OFF OR FALL ASLEEP WHEN YOU ARE A PASSENGER IN A CAR FOR AN HOUR WITHOUT A BREAK: SLIGHT CHANCE OF DOZING
HOW LIKELY ARE YOU TO NOD OFF OR FALL ASLEEP WHILE SITTING AND READING: SLIGHT CHANCE OF DOZING

## 2024-05-07 NOTE — PROGRESS NOTES
Patient ID: William Thomas is a 53 y.o. male who is being seen today for   Chief Complaint   Patient presents with    Follow-up     Sleep  [place]       Referring: Dr. Soy Palafox    HPI:   William Thomas is a 53 y.o. male for televideo appointment via video and audio virtual visit for JULIAN follow up.  States he is doing well with CPAP.  Patient is using CPAP   5-6 hrs/night. Using humidifier. No snoring on CPAP. The pressure is well tolerated. The mask is comfortable- nasal pillows. No mask leak. No significant daytime sleepiness. No nodding off when driving. No dry nose or throat. Some fatigue. Bedtime is 1 am and rise time is 7 am. Sleep onset is few minutes. Wakes up 0 times at night total. No nocturia.  Occasional naps during the day. No headache in am. No weight gain. 1 caffienated beverages during the day. Occasional alcohol. ESS is 7- discussed with patient            5/7/2024    11:26 AM 5/7/2024    11:13 AM 5/9/2023     1:13 PM 5/10/2022     2:55 PM 2/1/2022    10:54 AM   Sleep Medicine   Sitting and reading 1 1 1 1 3   Watching TV 2 2 0 1 2   Sitting, inactive in a public place (e.g. a theatre or a meeting) 0 1 0 0 0   As a passenger in a car for an hour without a break 1 1 0 0 0   Lying down to rest in the afternoon when circumstances permit 2 2 1 3 3   Sitting and talking to someone 0 1 0 0 0   Sitting quietly after a lunch without alcohol 1 1 1 1 1   In a car, while stopped for a few minutes in traffic 0 1 0 0 0   [place] Sleepiness Score 7 10 3 6 9       Past Medical History:  Past Medical History:   Diagnosis Date    Achilles tendonitis 2011    dr Valdo Summers    Bursitis of left hip 2011    Childhood asthma     childhood- no adult sx    History of colon surgery spring 2019    resection for large polyp    Hyperlipidemia     Murmur     JULIAN on CPAP     Pilonidal cyst     Strain of left knee     Dr Mahan       Past Surgical History:        Procedure Laterality Date    ABDOMEN SURGERY N/A 10/1/2020

## 2024-11-12 SDOH — ECONOMIC STABILITY: FOOD INSECURITY: WITHIN THE PAST 12 MONTHS, YOU WORRIED THAT YOUR FOOD WOULD RUN OUT BEFORE YOU GOT MONEY TO BUY MORE.: NEVER TRUE

## 2024-11-12 SDOH — ECONOMIC STABILITY: FOOD INSECURITY: WITHIN THE PAST 12 MONTHS, THE FOOD YOU BOUGHT JUST DIDN'T LAST AND YOU DIDN'T HAVE MONEY TO GET MORE.: NEVER TRUE

## 2024-11-12 SDOH — ECONOMIC STABILITY: INCOME INSECURITY: HOW HARD IS IT FOR YOU TO PAY FOR THE VERY BASICS LIKE FOOD, HOUSING, MEDICAL CARE, AND HEATING?: NOT HARD AT ALL

## 2024-11-12 ASSESSMENT — ANXIETY QUESTIONNAIRES
7. FEELING AFRAID AS IF SOMETHING AWFUL MIGHT HAPPEN: NOT AT ALL
1. FEELING NERVOUS, ANXIOUS, OR ON EDGE: NOT AT ALL
6. BECOMING EASILY ANNOYED OR IRRITABLE: NOT AT ALL
5. BEING SO RESTLESS THAT IT IS HARD TO SIT STILL: NOT AT ALL
3. WORRYING TOO MUCH ABOUT DIFFERENT THINGS: NOT AT ALL
4. TROUBLE RELAXING: NOT AT ALL
5. BEING SO RESTLESS THAT IT IS HARD TO SIT STILL: NOT AT ALL
1. FEELING NERVOUS, ANXIOUS, OR ON EDGE: NOT AT ALL
4. TROUBLE RELAXING: NOT AT ALL
3. WORRYING TOO MUCH ABOUT DIFFERENT THINGS: NOT AT ALL
7. FEELING AFRAID AS IF SOMETHING AWFUL MIGHT HAPPEN: NOT AT ALL
6. BECOMING EASILY ANNOYED OR IRRITABLE: NOT AT ALL
2. NOT BEING ABLE TO STOP OR CONTROL WORRYING: NOT AT ALL
GAD7 TOTAL SCORE: 0
2. NOT BEING ABLE TO STOP OR CONTROL WORRYING: NOT AT ALL

## 2024-11-12 ASSESSMENT — PATIENT HEALTH QUESTIONNAIRE - PHQ9
SUM OF ALL RESPONSES TO PHQ9 QUESTIONS 1 & 2: 0
SUM OF ALL RESPONSES TO PHQ QUESTIONS 1-9: 0
2. FEELING DOWN, DEPRESSED OR HOPELESS: NOT AT ALL
1. LITTLE INTEREST OR PLEASURE IN DOING THINGS: NOT AT ALL
SUM OF ALL RESPONSES TO PHQ QUESTIONS 1-9: 0
2. FEELING DOWN, DEPRESSED OR HOPELESS: NOT AT ALL
SUM OF ALL RESPONSES TO PHQ QUESTIONS 1-9: 0
SUM OF ALL RESPONSES TO PHQ QUESTIONS 1-9: 0
SUM OF ALL RESPONSES TO PHQ9 QUESTIONS 1 & 2: 0
1. LITTLE INTEREST OR PLEASURE IN DOING THINGS: NOT AT ALL

## 2024-11-13 ENCOUNTER — OFFICE VISIT (OUTPATIENT)
Dept: FAMILY MEDICINE CLINIC | Age: 54
End: 2024-11-13

## 2024-11-13 ENCOUNTER — TELEPHONE (OUTPATIENT)
Dept: FAMILY MEDICINE CLINIC | Age: 54
End: 2024-11-13

## 2024-11-13 VITALS
TEMPERATURE: 98.7 F | RESPIRATION RATE: 16 BRPM | DIASTOLIC BLOOD PRESSURE: 72 MMHG | SYSTOLIC BLOOD PRESSURE: 116 MMHG | OXYGEN SATURATION: 96 % | HEART RATE: 94 BPM | WEIGHT: 240 LBS | BODY MASS INDEX: 34.44 KG/M2

## 2024-11-13 DIAGNOSIS — H02.846 EYELID EDEMA, LEFT: ICD-10-CM

## 2024-11-13 DIAGNOSIS — H10.12 ALLERGIC CONJUNCTIVITIS OF LEFT EYE: Primary | ICD-10-CM

## 2024-11-13 RX ORDER — METHYLPREDNISOLONE 4 MG/1
TABLET ORAL
Qty: 1 KIT | Refills: 0 | Status: SHIPPED | OUTPATIENT
Start: 2024-11-13

## 2024-11-13 ASSESSMENT — ENCOUNTER SYMPTOMS
EYE DISCHARGE: 1
SHORTNESS OF BREATH: 0
PHOTOPHOBIA: 0
NAUSEA: 0
EYE PAIN: 0
RHINORRHEA: 0
ABDOMINAL PAIN: 0
EYE ITCHING: 0
DIARRHEA: 0
EYE REDNESS: 1
VOMITING: 0
TROUBLE SWALLOWING: 0
COUGH: 0
CHEST TIGHTNESS: 0
WHEEZING: 0
SINUS PRESSURE: 0

## 2024-11-13 NOTE — PROGRESS NOTES
Spoke to patient informed of conversation with Onelia. She would like a copy of her sleep study and a copy sent to Dr Mchugh. Acknowledged an understanding.We then discussed her cataracts. She states she is now ready and had seen Dr Clement in September. I told her to call there office and see how we handle this. She will call me back if I need to do a referral.         Review of Systems   Constitutional:  Negative for activity change, chills, fatigue, fever and unexpected weight change.   HENT:  Negative for congestion, drooling, ear discharge, postnasal drip, rhinorrhea, sinus pressure, sneezing, tinnitus and trouble swallowing.    Eyes:  Positive for discharge and redness. Negative for photophobia, pain, itching and visual disturbance.   Respiratory:  Negative for cough, chest tightness, shortness of breath and wheezing.    Cardiovascular:  Negative for chest pain, palpitations and leg swelling.   Gastrointestinal:  Negative for abdominal pain, diarrhea, nausea and vomiting.   Endocrine: Negative.    Genitourinary: Negative.    Musculoskeletal:  Negative for arthralgias and myalgias.   Skin: Negative.    Allergic/Immunologic: Negative for environmental allergies, food allergies and immunocompromised state.   Neurological:  Negative for dizziness, syncope, weakness, light-headedness and headaches.   Hematological: Negative.    Psychiatric/Behavioral: Negative.         Physical Exam  Vitals reviewed.   Constitutional:       General: He is not in acute distress.     Appearance: Normal appearance. He is obese. He is not ill-appearing or diaphoretic.   HENT:      Head: Normocephalic and atraumatic.      Right Ear: Tympanic membrane, ear canal and external ear normal.      Left Ear: Tympanic membrane, ear canal and external ear normal.      Nose: Nose normal.      Mouth/Throat:      Mouth: Mucous membranes are moist.      Pharynx: Oropharynx is clear.   Eyes:      General:         Right eye: No discharge.         Left eye: Discharge (clear) present.No foreign body or hordeolum.      Conjunctiva/sclera: Conjunctivae normal.      Pupils: Pupils are equal, round, and reactive to light.      Comments: Left eyelid swollen, not red or warm to the touch. Bilateral sclera appears dry/irritated.   Neck:      Thyroid: No thyromegaly or thyroid tenderness.   Cardiovascular:      Rate and

## 2024-11-13 NOTE — PATIENT INSTRUCTIONS
Hold Visine Allergy eyedrops, may be contributing to eye dryness  Begin Medrol dosepack to reduce inflammation  Use saline/eye rewetting drops to help with eye dryness  Continue warm compresses  Drink plenty of water  Follow up if symptoms persist or worsen.

## 2024-11-14 DIAGNOSIS — E78.5 HYPERLIPIDEMIA, UNSPECIFIED HYPERLIPIDEMIA TYPE: ICD-10-CM

## 2024-11-14 DIAGNOSIS — R73.03 PRE-DIABETES: ICD-10-CM

## 2024-11-14 DIAGNOSIS — R79.89 LOW VITAMIN D LEVEL: ICD-10-CM

## 2024-11-14 DIAGNOSIS — Z00.00 ANNUAL PHYSICAL EXAM: Primary | ICD-10-CM

## 2024-11-27 DIAGNOSIS — Z00.00 ANNUAL PHYSICAL EXAM: ICD-10-CM

## 2024-11-27 DIAGNOSIS — R79.89 LOW VITAMIN D LEVEL: ICD-10-CM

## 2024-11-27 DIAGNOSIS — E78.5 HYPERLIPIDEMIA, UNSPECIFIED HYPERLIPIDEMIA TYPE: ICD-10-CM

## 2024-11-27 DIAGNOSIS — R73.03 PRE-DIABETES: ICD-10-CM

## 2024-11-28 LAB
25(OH)D3 SERPL-MCNC: 7.4 NG/ML
ALBUMIN SERPL-MCNC: 4.5 G/DL (ref 3.4–5)
ALBUMIN/GLOB SERPL: 1.8 {RATIO} (ref 1.1–2.2)
ALP SERPL-CCNC: 55 U/L (ref 40–129)
ALT SERPL-CCNC: 29 U/L (ref 10–40)
ANION GAP SERPL CALCULATED.3IONS-SCNC: 14 MMOL/L (ref 3–16)
AST SERPL-CCNC: 25 U/L (ref 15–37)
BASOPHILS # BLD: 0 K/UL (ref 0–0.2)
BASOPHILS NFR BLD: 0.4 %
BILIRUB SERPL-MCNC: 0.9 MG/DL (ref 0–1)
BUN SERPL-MCNC: 7 MG/DL (ref 7–20)
CALCIUM SERPL-MCNC: 9.9 MG/DL (ref 8.3–10.6)
CHLORIDE SERPL-SCNC: 98 MMOL/L (ref 99–110)
CHOLEST SERPL-MCNC: 333 MG/DL (ref 0–199)
CO2 SERPL-SCNC: 26 MMOL/L (ref 21–32)
CREAT SERPL-MCNC: 1.1 MG/DL (ref 0.9–1.3)
DEPRECATED RDW RBC AUTO: 14.6 % (ref 12.4–15.4)
EOSINOPHIL # BLD: 0.2 K/UL (ref 0–0.6)
EOSINOPHIL NFR BLD: 2.6 %
EST. AVERAGE GLUCOSE BLD GHB EST-MCNC: 139.9 MG/DL
GFR SERPLBLD CREATININE-BSD FMLA CKD-EPI: 81 ML/MIN/{1.73_M2}
GLUCOSE SERPL-MCNC: 109 MG/DL (ref 70–99)
HBA1C MFR BLD: 6.5 %
HCT VFR BLD AUTO: 46.5 % (ref 40.5–52.5)
HDLC SERPL-MCNC: 35 MG/DL (ref 40–60)
HGB BLD-MCNC: 16 G/DL (ref 13.5–17.5)
LDLC SERPL CALC-MCNC: ABNORMAL MG/DL
LDLC SERPL-MCNC: 244 MG/DL
LYMPHOCYTES # BLD: 3.2 K/UL (ref 1–5.1)
LYMPHOCYTES NFR BLD: 43 %
MCH RBC QN AUTO: 33.9 PG (ref 26–34)
MCHC RBC AUTO-ENTMCNC: 34.5 G/DL (ref 31–36)
MCV RBC AUTO: 98.2 FL (ref 80–100)
MONOCYTES # BLD: 0.6 K/UL (ref 0–1.3)
MONOCYTES NFR BLD: 7.5 %
NEUTROPHILS # BLD: 3.4 K/UL (ref 1.7–7.7)
NEUTROPHILS NFR BLD: 46.5 %
PLATELET # BLD AUTO: 251 K/UL (ref 135–450)
PMV BLD AUTO: 9.5 FL (ref 5–10.5)
POTASSIUM SERPL-SCNC: 5.5 MMOL/L (ref 3.5–5.1)
PROT SERPL-MCNC: 7 G/DL (ref 6.4–8.2)
PSA SERPL DL<=0.01 NG/ML-MCNC: 0.54 NG/ML (ref 0–4)
RBC # BLD AUTO: 4.73 M/UL (ref 4.2–5.9)
SODIUM SERPL-SCNC: 138 MMOL/L (ref 136–145)
TRIGL SERPL-MCNC: 323 MG/DL (ref 0–150)
TSH SERPL DL<=0.005 MIU/L-ACNC: 1.17 UIU/ML (ref 0.27–4.2)
VLDLC SERPL CALC-MCNC: ABNORMAL MG/DL
WBC # BLD AUTO: 7.4 K/UL (ref 4–11)

## 2024-12-02 ENCOUNTER — OFFICE VISIT (OUTPATIENT)
Dept: FAMILY MEDICINE CLINIC | Age: 54
End: 2024-12-02

## 2024-12-02 VITALS
WEIGHT: 239 LBS | RESPIRATION RATE: 16 BRPM | BODY MASS INDEX: 34.22 KG/M2 | OXYGEN SATURATION: 99 % | HEIGHT: 70 IN | HEART RATE: 96 BPM | DIASTOLIC BLOOD PRESSURE: 82 MMHG | SYSTOLIC BLOOD PRESSURE: 130 MMHG | TEMPERATURE: 97.3 F

## 2024-12-02 DIAGNOSIS — E78.5 HYPERLIPIDEMIA, UNSPECIFIED HYPERLIPIDEMIA TYPE: ICD-10-CM

## 2024-12-02 DIAGNOSIS — R73.02 IGT (IMPAIRED GLUCOSE TOLERANCE): ICD-10-CM

## 2024-12-02 DIAGNOSIS — Z00.00 ANNUAL PHYSICAL EXAM: Primary | ICD-10-CM

## 2024-12-02 DIAGNOSIS — E66.811 CLASS 1 OBESITY DUE TO EXCESS CALORIES WITH SERIOUS COMORBIDITY AND BODY MASS INDEX (BMI) OF 34.0 TO 34.9 IN ADULT: ICD-10-CM

## 2024-12-02 DIAGNOSIS — E66.09 CLASS 1 OBESITY DUE TO EXCESS CALORIES WITH SERIOUS COMORBIDITY AND BODY MASS INDEX (BMI) OF 34.0 TO 34.9 IN ADULT: ICD-10-CM

## 2024-12-02 DIAGNOSIS — Z23 NEEDS FLU SHOT: ICD-10-CM

## 2024-12-02 RX ORDER — ROSUVASTATIN CALCIUM 10 MG/1
10 TABLET, COATED ORAL DAILY
Qty: 30 TABLET | Refills: 5 | Status: SHIPPED | OUTPATIENT
Start: 2024-12-02

## 2024-12-02 RX ORDER — DOXYCYCLINE HYCLATE 100 MG
100 TABLET ORAL 2 TIMES DAILY
Qty: 30 TABLET | Refills: 0 | Status: SHIPPED | OUTPATIENT
Start: 2024-12-02

## 2024-12-02 RX ORDER — ERGOCALCIFEROL 1.25 MG/1
50000 CAPSULE, LIQUID FILLED ORAL WEEKLY
Qty: 4 CAPSULE | Refills: 5 | Status: SHIPPED | OUTPATIENT
Start: 2024-12-02

## 2024-12-02 SDOH — ECONOMIC STABILITY: FOOD INSECURITY: WITHIN THE PAST 12 MONTHS, THE FOOD YOU BOUGHT JUST DIDN'T LAST AND YOU DIDN'T HAVE MONEY TO GET MORE.: NEVER TRUE

## 2024-12-02 SDOH — ECONOMIC STABILITY: INCOME INSECURITY: HOW HARD IS IT FOR YOU TO PAY FOR THE VERY BASICS LIKE FOOD, HOUSING, MEDICAL CARE, AND HEATING?: NOT HARD AT ALL

## 2024-12-02 SDOH — ECONOMIC STABILITY: FOOD INSECURITY: WITHIN THE PAST 12 MONTHS, YOU WORRIED THAT YOUR FOOD WOULD RUN OUT BEFORE YOU GOT MONEY TO BUY MORE.: NEVER TRUE

## 2024-12-02 ASSESSMENT — PATIENT HEALTH QUESTIONNAIRE - PHQ9
2. FEELING DOWN, DEPRESSED OR HOPELESS: NOT AT ALL
SUM OF ALL RESPONSES TO PHQ QUESTIONS 1-9: 0
1. LITTLE INTEREST OR PLEASURE IN DOING THINGS: NOT AT ALL
SUM OF ALL RESPONSES TO PHQ QUESTIONS 1-9: 0
SUM OF ALL RESPONSES TO PHQ9 QUESTIONS 1 & 2: 0

## 2024-12-02 NOTE — PROGRESS NOTES
Subjective:      Patient ID: William Thomas is a 53 y.o. y.o. male.  Annual physical    Meds and hx reviewed    Reviewed labs    Cholesterol up,  Vitamin D low  A1c  6.5-  DM / borderline discussed.    Importance of diet and exercise discussed and reviewed.    HPI      Chief Complaint   Patient presents with    Annual Exam     Had blood work 11/27/24       No Known Allergies    Past Medical History:   Diagnosis Date    Achilles tendonitis 2011    dr Valdo Summers    Bursitis of left hip 2011    Childhood asthma     childhood- no adult sx    History of colon surgery spring 2019    resection for large polyp    Hyperlipidemia     Murmur     JULIAN on CPAP     Pilonidal cyst     Strain of left knee     Dr Mahan       Past Surgical History:   Procedure Laterality Date    ABDOMEN SURGERY N/A 10/1/2020    ABDOMINAL WALL IRRIGATION AND DEBRIDEMENT performed by Joaquin Rose MD at [facility] OR    ANKLE ARTHROSCOPY Left 2012    Dr Summers    COLON SURGERY  03/2019    sigmoid resection- polyposis / TICs    COLONOSCOPY N/A 12/18/2018    COLONOSCOPY POLYPECTOMY SNARE/COLD BIOPSY performed by Abhilash Juarez MD at [facility]    COLONOSCOPY N/A 12/18/2018    COLONOSCOPY WITH BIOPSY performed by Abhilash Juarez MD at [facility]    PILONIDAL CYST EXCISION N/A 8/5/2022    SURGICAL REMOVAL OF PILONIDAL CYST performed by Joaquin Rose MD at [facility] OR    PILONIDAL CYST EXCISION N/A 12/8/2023    PILONIDAL CYSTECTOMY performed by Joaquin Rose MD at [facility] OR    UPPER GASTROINTESTINAL ENDOSCOPY  12/18/2018    COLONOSCOPY SUBMUCOSAL/BOTOX INJECTION performed by Abhilash Juarez MD at [facility]       Social History     Socioeconomic History    Marital status:      Spouse name: Not on file    Number of children: Not on file    Years of education: Not on file    Highest education level: Not on file   Occupational History    Occupation: bank    Tobacco Use

## 2024-12-02 NOTE — PATIENT INSTRUCTIONS
Cut the amount that you smoke.-  work on limiting how much you smoke in a day and gradually decrease.    Smaller portions of all carbohydrate foots-   sweets,  starches, bread, past / potatoes etc.'    R=try to walk 3-4 times a week,  20-30 minute walk    Recheck this in 3-4 months

## 2025-03-27 ENCOUNTER — TELEMEDICINE (OUTPATIENT)
Age: 55
End: 2025-03-27

## 2025-03-27 ENCOUNTER — TELEPHONE (OUTPATIENT)
Age: 55
End: 2025-03-27

## 2025-03-27 DIAGNOSIS — M25.561 ACUTE BILATERAL KNEE PAIN: Primary | ICD-10-CM

## 2025-03-27 DIAGNOSIS — M25.562 ACUTE BILATERAL KNEE PAIN: Primary | ICD-10-CM

## 2025-03-27 PROCEDURE — 99213 OFFICE O/P EST LOW 20 MIN: CPT | Performed by: NURSE PRACTITIONER

## 2025-03-27 RX ORDER — METHYLPREDNISOLONE 4 MG/1
TABLET ORAL
Qty: 1 KIT | Refills: 0 | Status: SHIPPED | OUTPATIENT
Start: 2025-03-27 | End: 2025-04-02

## 2025-03-27 ASSESSMENT — ENCOUNTER SYMPTOMS: COLOR CHANGE: 0

## 2025-03-27 NOTE — PROGRESS NOTES
William Thomas (:  1970) is a Established patient, here for evaluation of the following:    Joint Swelling (X6 days: localized Bilat knee swelling) and Knee Pain (X6 days : localized Bilat knee pain)       Assessment & Plan:  Below is the assessment and plan developed based on review of pertinent history, physical exam, labs, studies, and medications.  1. Acute bilateral knee pain  -     methylPREDNISolone (MEDROL DOSEPACK) 4 MG tablet; Take by mouth., Disp-1 kit, R-0Normal  Will treat with exercises for arthritis and steroids. Patient is already scheduled to see pcp in person next week.  The patient would benefit from future follow up with their usual PCP. As of the end of their Virtualist Visit today, follow up visit status is as follows: Visit previously scheduled    Return if symptoms worsen or fail to improve.    Subjective:   Patient reports knee swelling in his right knee that started 6 days ago. Four days later he developed left knee pain and swelling (believes this was from compensating for the right knee when walking). Walking and standing makes it worse. The right knee has improved spontaneously the past two days but the left is severe. Has taken aleve and tylenol without relief. He has used a brace to help with the swelling but hasn't noticed any improvement. He notes he had something similar five years ago and was prescribed an anti-inflammatory that helped. He hasn't had a recurrence since them.  This is not due to any exercise related injury. He has a desk job. His annual physical is next week.       Review of Systems   Constitutional:  Negative for fever.   Musculoskeletal:  Positive for arthralgias, gait problem and joint swelling.   Skin:  Negative for color change.       Objective:  Patient-Reported Vitals  No data recorded         3/27/2025     1:12 PM   Patient-Reported Vitals   Patient-Reported Weight 225   Patient-Reported Height 5’10        Physical Exam:  [INSTRUCTIONS:  \"[x]\"

## 2025-03-27 NOTE — TELEPHONE ENCOUNTER
This MA attempted to reach pt with intent to begin rooming and ensure audio/camera works properly. No answer. This MA will attempt at a later time to reach pt.

## 2025-03-29 SDOH — ECONOMIC STABILITY: FOOD INSECURITY: WITHIN THE PAST 12 MONTHS, THE FOOD YOU BOUGHT JUST DIDN'T LAST AND YOU DIDN'T HAVE MONEY TO GET MORE.: NEVER TRUE

## 2025-03-29 SDOH — ECONOMIC STABILITY: INCOME INSECURITY: IN THE LAST 12 MONTHS, WAS THERE A TIME WHEN YOU WERE NOT ABLE TO PAY THE MORTGAGE OR RENT ON TIME?: NO

## 2025-03-29 SDOH — ECONOMIC STABILITY: FOOD INSECURITY: WITHIN THE PAST 12 MONTHS, YOU WORRIED THAT YOUR FOOD WOULD RUN OUT BEFORE YOU GOT MONEY TO BUY MORE.: NEVER TRUE

## 2025-03-29 ASSESSMENT — PATIENT HEALTH QUESTIONNAIRE - PHQ9
SUM OF ALL RESPONSES TO PHQ QUESTIONS 1-9: 0
1. LITTLE INTEREST OR PLEASURE IN DOING THINGS: NOT AT ALL
2. FEELING DOWN, DEPRESSED OR HOPELESS: NOT AT ALL

## 2025-04-01 ENCOUNTER — OFFICE VISIT (OUTPATIENT)
Dept: FAMILY MEDICINE CLINIC | Age: 55
End: 2025-04-01

## 2025-04-01 VITALS
HEART RATE: 105 BPM | TEMPERATURE: 97.7 F | DIASTOLIC BLOOD PRESSURE: 87 MMHG | BODY MASS INDEX: 31.89 KG/M2 | WEIGHT: 221 LBS | OXYGEN SATURATION: 98 % | SYSTOLIC BLOOD PRESSURE: 142 MMHG | RESPIRATION RATE: 16 BRPM

## 2025-04-01 DIAGNOSIS — E55.9 VITAMIN D DEFICIENCY: ICD-10-CM

## 2025-04-01 DIAGNOSIS — E66.811 CLASS 1 OBESITY DUE TO EXCESS CALORIES WITH SERIOUS COMORBIDITY AND BODY MASS INDEX (BMI) OF 34.0 TO 34.9 IN ADULT: ICD-10-CM

## 2025-04-01 DIAGNOSIS — E66.09 CLASS 1 OBESITY DUE TO EXCESS CALORIES WITH SERIOUS COMORBIDITY AND BODY MASS INDEX (BMI) OF 34.0 TO 34.9 IN ADULT: ICD-10-CM

## 2025-04-01 DIAGNOSIS — E78.5 HYPERLIPIDEMIA, UNSPECIFIED HYPERLIPIDEMIA TYPE: Primary | ICD-10-CM

## 2025-04-01 DIAGNOSIS — R73.09 ELEVATED GLUCOSE: ICD-10-CM

## 2025-04-01 DIAGNOSIS — E78.5 HYPERLIPIDEMIA, UNSPECIFIED HYPERLIPIDEMIA TYPE: ICD-10-CM

## 2025-04-01 DIAGNOSIS — Z01.818 PRE-OP TESTING: ICD-10-CM

## 2025-04-01 LAB
25(OH)D3 SERPL-MCNC: 40.4 NG/ML
ALBUMIN SERPL-MCNC: 4.8 G/DL (ref 3.4–5)
ALBUMIN/GLOB SERPL: 1.8 {RATIO} (ref 1.1–2.2)
ALP SERPL-CCNC: 53 U/L (ref 40–129)
ALT SERPL-CCNC: 109 U/L (ref 10–40)
ANION GAP SERPL CALCULATED.3IONS-SCNC: 12 MMOL/L (ref 3–16)
AST SERPL-CCNC: 34 U/L (ref 15–37)
BILIRUB SERPL-MCNC: 0.9 MG/DL (ref 0–1)
BUN SERPL-MCNC: 14 MG/DL (ref 7–20)
CALCIUM SERPL-MCNC: 10 MG/DL (ref 8.3–10.6)
CHLORIDE SERPL-SCNC: 98 MMOL/L (ref 99–110)
CHOLEST SERPL-MCNC: 200 MG/DL (ref 0–199)
CO2 SERPL-SCNC: 27 MMOL/L (ref 21–32)
CREAT SERPL-MCNC: 1 MG/DL (ref 0.9–1.3)
EST. AVERAGE GLUCOSE BLD GHB EST-MCNC: 125.5 MG/DL
GFR SERPLBLD CREATININE-BSD FMLA CKD-EPI: >90 ML/MIN/{1.73_M2}
GLUCOSE SERPL-MCNC: 102 MG/DL (ref 70–99)
HBA1C MFR BLD: 6 %
HDLC SERPL-MCNC: 51 MG/DL (ref 40–60)
LDLC SERPL CALC-MCNC: ABNORMAL MG/DL
LDLC SERPL-MCNC: 96 MG/DL
POTASSIUM SERPL-SCNC: 4.5 MMOL/L (ref 3.5–5.1)
PROT SERPL-MCNC: 7.5 G/DL (ref 6.4–8.2)
SODIUM SERPL-SCNC: 137 MMOL/L (ref 136–145)
TRIGL SERPL-MCNC: 393 MG/DL (ref 0–150)
VLDLC SERPL CALC-MCNC: ABNORMAL MG/DL

## 2025-04-01 ASSESSMENT — PATIENT HEALTH QUESTIONNAIRE - PHQ9
1. LITTLE INTEREST OR PLEASURE IN DOING THINGS: NOT AT ALL
2. FEELING DOWN, DEPRESSED OR HOPELESS: NOT AT ALL
SUM OF ALL RESPONSES TO PHQ9 QUESTIONS 1 & 2: 0

## 2025-04-01 ASSESSMENT — ENCOUNTER SYMPTOMS
GASTROINTESTINAL NEGATIVE: 1
TROUBLE SWALLOWING: 0
SORE THROAT: 0
COUGH: 0
SHORTNESS OF BREATH: 0

## 2025-04-01 NOTE — PATIENT INSTRUCTIONS
BP is good.  EKG is same  / stable for years     We will call with the vitamin D levels and labs on Wednesday

## 2025-04-01 NOTE — PROGRESS NOTES
Subjective:      Patient ID: William Thomas is a 54 y.o. y.o. male.  Following up BP and lipids.  Is having full mouth dental implants.    Hyperlipidemia  Pertinent negatives include no chest pain or shortness of breath.       Chief Complaint   Patient presents with    Hyperlipidemia     4 month f/u - had blood work this morning    EKG Only     Needed for dental apt 4/23/25       No Known Allergies    Past Medical History:   Diagnosis Date    Achilles tendonitis 2011    dr Valdo Summers    Bursitis of left hip 2011    Childhood asthma     childhood- no adult sx    History of colon surgery spring 2019    resection for large polyp    Hyperlipidemia     Murmur     JULIAN on CPAP     Pilonidal cyst     Strain of left knee     Dr Mahan       Past Surgical History:   Procedure Laterality Date    ABDOMEN SURGERY N/A 10/1/2020    ABDOMINAL WALL IRRIGATION AND DEBRIDEMENT performed by Joaquin Rose MD at [facility] OR    ANKLE ARTHROSCOPY Left 2012    Dr Summers    COLON SURGERY  03/2019    sigmoid resection- polyposis / TICs    COLONOSCOPY N/A 12/18/2018    COLONOSCOPY POLYPECTOMY SNARE/COLD BIOPSY performed by Abhilash Juarez MD at [facility] ENDOSCOPY    COLONOSCOPY N/A 12/18/2018    COLONOSCOPY WITH BIOPSY performed by Abhilash Juarez MD at [facility] ENDOSCOPY    PILONIDAL CYST EXCISION N/A 8/5/2022    SURGICAL REMOVAL OF PILONIDAL CYST performed by Joaquin Rose MD at [facility] OR    PILONIDAL CYST EXCISION N/A 12/8/2023    PILONIDAL CYSTECTOMY performed by Joaquin Rose MD at [facility] OR    UPPER GASTROINTESTINAL ENDOSCOPY  12/18/2018    COLONOSCOPY SUBMUCOSAL/BOTOX INJECTION performed by Abhilash Juarez MD at [facility] ENDOSCOPY       Social History     Socioeconomic History    Marital status:      Spouse name: Not on file    Number of children: Not on file    Years of education: Not on file    Highest education level: Not on file   Occupational History    Occupation: bank

## 2025-04-27 ENCOUNTER — RESULTS FOLLOW-UP (OUTPATIENT)
Dept: FAMILY MEDICINE CLINIC | Age: 55
End: 2025-04-27

## 2025-05-06 ENCOUNTER — TELEMEDICINE (OUTPATIENT)
Dept: SLEEP MEDICINE | Age: 55
End: 2025-05-06
Payer: COMMERCIAL

## 2025-05-06 DIAGNOSIS — Z71.89 CPAP USE COUNSELING: ICD-10-CM

## 2025-05-06 DIAGNOSIS — E66.9 OBESITY (BMI 30-39.9): ICD-10-CM

## 2025-05-06 DIAGNOSIS — G47.33 MODERATE OBSTRUCTIVE SLEEP APNEA: Primary | ICD-10-CM

## 2025-05-06 PROCEDURE — 99213 OFFICE O/P EST LOW 20 MIN: CPT | Performed by: NURSE PRACTITIONER

## 2025-05-06 RX ORDER — IBUPROFEN 600 MG/1
TABLET, FILM COATED ORAL
COMMUNITY
Start: 2025-04-23

## 2025-05-06 ASSESSMENT — SLEEP AND FATIGUE QUESTIONNAIRES
HOW LIKELY ARE YOU TO NOD OFF OR FALL ASLEEP WHILE SITTING AND TALKING TO SOMEONE: WOULD NEVER DOZE
HOW LIKELY ARE YOU TO NOD OFF OR FALL ASLEEP IN A CAR, WHILE STOPPED FOR A FEW MINUTES IN TRAFFIC: WOULD NEVER DOZE
HOW LIKELY ARE YOU TO NOD OFF OR FALL ASLEEP WHILE LYING DOWN TO REST IN THE AFTERNOON WHEN CIRCUMSTANCES PERMIT: SLIGHT CHANCE OF DOZING
HOW LIKELY ARE YOU TO NOD OFF OR FALL ASLEEP WHILE SITTING QUIETLY AFTER LUNCH WITHOUT ALCOHOL: SLIGHT CHANCE OF DOZING
HOW LIKELY ARE YOU TO NOD OFF OR FALL ASLEEP WHILE SITTING INACTIVE IN A PUBLIC PLACE: WOULD NEVER DOZE
HOW LIKELY ARE YOU TO NOD OFF OR FALL ASLEEP WHILE WATCHING TV: WOULD NEVER DOZE
HOW LIKELY ARE YOU TO NOD OFF OR FALL ASLEEP WHILE SITTING AND READING: SLIGHT CHANCE OF DOZING
HOW LIKELY ARE YOU TO NOD OFF OR FALL ASLEEP WHEN YOU ARE A PASSENGER IN A CAR FOR AN HOUR WITHOUT A BREAK: WOULD NEVER DOZE
ESS TOTAL SCORE: 3

## 2025-05-06 NOTE — PROGRESS NOTES
Patient ID: William Thomas is a 54 y.o. male who is being seen today for   Chief Complaint   Patient presents with    Sleep Apnea     1 year follow up       Referring: Dr. Soy Palafox    HPI:   William Thomas is a 54 y.o. male for televideo appointment via video and audio virtual visit for JULIAN follow up.  States he is doing well with CPAP.  Patient is using CPAP   5-6.5 hrs/night. Using humidifier. No snoring on CPAP. The pressure is well tolerated. The mask is comfortable- nasal pillows. No mask leak. No significant daytime sleepiness. No nodding off when driving. No dry nose or throat. No fatigue. Bedtime is 1 am and rise time is 7-8 am. Sleep onset is few minutes. Wakes up 0 times at night total. No nocturia.  Occasional naps during the day. No headache in am. No weight gain. 1 caffienated beverages during the day. Occasional alcohol. ESS is 3          5/6/2025    10:55 AM 5/7/2024    11:26 AM 5/7/2024    11:13 AM 5/9/2023     1:13 PM 5/10/2022     2:55 PM 2/1/2022    10:54 AM   Sleep Medicine   Sitting and reading 1 1 1 1 1 3   Watching TV 0 2 2 0 1 2   Sitting, inactive in a public place (e.g. a theatre or a meeting) 0 0 1 0 0 0   As a passenger in a car for an hour without a break 0 1 1 0 0 0   Lying down to rest in the afternoon when circumstances permit 1 2 2 1 3 3   Sitting and talking to someone 0 0 1 0 0 0   Sitting quietly after a lunch without alcohol 1 1 1 1 1 1   In a car, while stopped for a few minutes in traffic 0 0 1 0 0 0   [place] Sleepiness Score 3 7 10 3 6 9       Past Medical History:  Past Medical History:   Diagnosis Date    Achilles tendonitis 2011    dr [employer]    Bursitis of left hip 2011    Childhood asthma     childhood- no adult sx    History of colon surgery spring 2019    resection for large polyp    Hyperlipidemia     Murmur     JULIAN on CPAP     Pilonidal cyst     Strain of left knee     Dr Mahan       Past Surgical History:        Procedure Laterality Date    ABDOMEN

## (undated) DEVICE — DRESSING PETRO W3XL3IN OIL EMUL N ADH GZ KNIT IMPREG CELOS

## (undated) DEVICE — GAUZE,PACKING STRIP,IODOFORM,1"X5YD,STRL: Brand: CURAD

## (undated) DEVICE — Device

## (undated) DEVICE — GLOVE SURG SZ 65 THK91MIL LTX FREE SYN POLYISOPRENE

## (undated) DEVICE — GAUZE,SPONGE,4"X4",8PLY,STRL,LF,10/TRAY: Brand: MEDLINE

## (undated) DEVICE — SUTURE NONABSORBABLE MONOFILAMENT 3-0 PS-1 18 IN BLK ETHILON 1663H

## (undated) DEVICE — UNDERPAD INCONT XL MESH PROTCT + DISP FOR MAT USE

## (undated) DEVICE — SUTURE VCRL + SZ 3-0 L18IN ABSRB UD SH 1/2 CIR TAPERCUT NDL VCP864D

## (undated) DEVICE — GLOVE,SURG,SENSICARE SLT,LF,PF,7: Brand: MEDLINE

## (undated) DEVICE — FORCEP BX STD CAP 240CM RAD JAW 4

## (undated) DEVICE — Device: Brand: DISPOSABLE ELECTROSURGICAL SNARE

## (undated) DEVICE — TRAP SPEC POLYPR MULT CHMBR DISP

## (undated) DEVICE — GROUNDING PAD /C 9FT CORD  PK/25

## (undated) DEVICE — ENDO CARRY-ON PROCEDURE KIT INCLUDES SUCTION TUBING, LUBRICANT, GAUZE, BIOHAZARD STICKER, TRANSPORT PAD AND INTERCEPT BEDSIDE KIT.: Brand: ENDO CARRY-ON PROCEDURE KIT

## (undated) DEVICE — PAD,ABDOMINAL,8"X10",ST,LF: Brand: MEDLINE

## (undated) DEVICE — NEEDLE SCLERO 23GA L240CM OD064MM ID032MM CLR INTERJECT